# Patient Record
Sex: FEMALE | Race: WHITE | NOT HISPANIC OR LATINO | Employment: STUDENT | URBAN - METROPOLITAN AREA
[De-identification: names, ages, dates, MRNs, and addresses within clinical notes are randomized per-mention and may not be internally consistent; named-entity substitution may affect disease eponyms.]

---
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
## 2020-02-10 ENCOUNTER — HOSPITAL ENCOUNTER (OUTPATIENT)
Dept: ULTRASOUND IMAGING | Facility: HOSPITAL | Age: 20
Discharge: HOME | End: 2020-02-10
Admitting: SURGERY

## 2020-02-10 DIAGNOSIS — R92.8 ABNORMAL ULTRASOUND OF BREAST: ICD-10-CM

## 2020-02-10 PROCEDURE — 76642 ULTRASOUND BREAST LIMITED: CPT

## 2020-02-12 ENCOUNTER — TELEPHONE (OUTPATIENT)
Dept: SURGERY | Facility: CLINIC | Age: 20
End: 2020-02-12

## 2020-02-12 NOTE — TELEPHONE ENCOUNTER
Left breast ultrasound [facility] February 10, 2020: 19-year-old female with an area of pain in the left breast.  Targeted ultrasound through the area of pain corresponding to 9 through 12:00 shows normal-appearing fibroglandular tissue.  No abnormality.

## 2020-02-20 ENCOUNTER — TELEPHONE (OUTPATIENT)
Dept: OBSTETRICS AND GYNECOLOGY | Age: 20
End: 2020-02-20

## 2020-02-20 NOTE — TELEPHONE ENCOUNTER
Patient had her Kyleena inserted in 5/19 and has had off and on spotting.Her last episode of spotting was 12/19.She is aware that some spotting is to be expected for a few months.She just noticed some more spotting earlier when she used the bathroom.No cramping. Just wants to make sure this is ok.Please advise.

## 2020-02-20 NOTE — TELEPHONE ENCOUNTER
Spoke with patient , she is doing good, had some unexpected  spotting last night just wanted to check if that was ok since had no period or spotting since July , 2019.  Reassured her that spotting is not unusual with Kyadonisena , patient verbally understanding , will follow up with dr looney in march this yr.

## 2020-03-17 ENCOUNTER — TELEPHONE (OUTPATIENT)
Dept: SURGERY | Facility: CLINIC | Age: 20
End: 2020-03-17

## 2020-03-17 NOTE — TELEPHONE ENCOUNTER
I had to leave patient a message to call back.   Follow up appt with Dr. Méndez 3/24/2020 canceled due to COVID19 epidemic.     I ask that patient call back and confirm.   
No

## 2020-04-07 ENCOUNTER — TELEPHONE (OUTPATIENT)
Dept: SURGERY | Facility: CLINIC | Age: 20
End: 2020-04-07

## 2020-04-27 ENCOUNTER — TELEPHONE (OUTPATIENT)
Dept: SURGERY | Facility: CLINIC | Age: 20
End: 2020-04-27

## 2020-05-07 ENCOUNTER — TELEPHONE (OUTPATIENT)
Dept: SURGERY | Facility: CLINIC | Age: 20
End: 2020-05-07

## 2020-05-07 NOTE — TELEPHONE ENCOUNTER
Patient called to cancel appointment with Dr. Méndez on 5/8/2020.    I will have Kelsey call her back to reschedule.

## 2020-05-08 ENCOUNTER — TELEPHONE (OUTPATIENT)
Dept: SURGERY | Facility: CLINIC | Age: 20
End: 2020-05-08

## 2020-05-08 NOTE — TELEPHONE ENCOUNTER
Received message from Neris, patient canceled her appt on 5-8-2020 and needed to reschedule.      Spoke to patient she will see Dr. NAVARRO 5-22-20 arrive 1:45  Pt v/uFlorencia Cole

## 2020-05-22 ENCOUNTER — TELEPHONE (OUTPATIENT)
Dept: SURGERY | Facility: CLINIC | Age: 20
End: 2020-05-22

## 2020-05-22 NOTE — TELEPHONE ENCOUNTER
Received message from Neris, patient canceled her appointment  5-22-20 with .    I have rescheduled her for 6-12-20 arrive 11:15    LM on patient's phone.    Kelesy

## 2020-06-19 ENCOUNTER — OFFICE VISIT (OUTPATIENT)
Dept: SURGERY | Facility: CLINIC | Age: 20
End: 2020-06-19

## 2020-06-19 VITALS
OXYGEN SATURATION: 98 % | BODY MASS INDEX: 25.03 KG/M2 | SYSTOLIC BLOOD PRESSURE: 119 MMHG | WEIGHT: 169 LBS | HEART RATE: 67 BPM | HEIGHT: 69 IN | DIASTOLIC BLOOD PRESSURE: 79 MMHG

## 2020-06-19 DIAGNOSIS — N64.4 MASTODYNIA: ICD-10-CM

## 2020-06-19 DIAGNOSIS — N63.0 LUMP OR MASS IN BREAST: ICD-10-CM

## 2020-06-19 DIAGNOSIS — N60.01 SOLITARY CYST OF RIGHT BREAST: Primary | ICD-10-CM

## 2020-06-19 DIAGNOSIS — N61.0 CELLULITIS OF BREAST: ICD-10-CM

## 2020-06-19 DIAGNOSIS — R92.8 ABNORMAL ULTRASOUND OF BREAST: ICD-10-CM

## 2020-06-19 PROCEDURE — 99212 OFFICE O/P EST SF 10 MIN: CPT | Performed by: SURGERY

## 2020-06-19 NOTE — PROGRESS NOTES
Chief Complaint: Sara Saul is a 20 y.o. female who was seen in consultation at the request of Henri Tran MD  for breast mass and a followup visit  RIGHT BREAST     History of Present Illness:  Patient presents with breast mass, abnormal breast imaging and breast infection.  She noted a RIGHT breast mass the size of a golf ball  when she got out of the shower.  She was then seen and placed on Levaquin which she took from  through .  She tells me that the mass and the majority of the redness and all of the tenderness resolved.  She tells me that there is some mild pink skin discoloration residual on a little bit of skin thickening at that site.  She has been off antibiotics since .  She noted no other new masses, skin changes, nipple discharge, nipple changes prior to her most recent imaging.  Her most recent imaging includes the followin2019  [facility]    Radiology  Sara Saul  US BREAST RIGHT LIMITED  Area of clinical concern right breast 9:00 4 cm from the nipple. There is a 1.9 x 0.9 x 1.3 cm area of heterogeneous echotexture. There is an internal hypoechoic focus that measures 0.3 cm.  Most consistent with an area of inflammation, edema and/or phlegmon. No evidence for an abscess.  BIRADS Category 3    She has not had a breast biopsy in the past.  She has her uterus and ovaries, is premenopausal, and takes nor hormones.  Her family history includes the following:  No family history of breast or ovarian cancer.  She is not a smoker.    In the interim the area of redness on the right breast has resolved.  She continued the mupirocin ointment to this area until it resolved.  The area in the left breast that we noted had her get an ultrasound for feels tender to her.  Otherwise it has not changed.  She denies any other masses, nipple changes, nipple discharge, skin changes in either breast.    Interval History:  In the interim,  Sara Saul  has done  well.  She reports that she has had no further redness on the right breast.  She reports that the area of tenderness on the left breast is not tender today and has become less pronounced on examination to her.  She says that it is somewhat cyclical in its presence.    She has noted no other changes in her breast exam. No new masses, skin changes, nipple changes, nipple discharge either breast.   She denies headache, bone pain, belly pain, cough, changes in vision or gait.      Her most recent imaging includes the following:  Left breast ultrasound [facility] February 10, 2020: 19-year-old female with an area of pain in the left breast.  Targeted ultrasound through the area of pain corresponding to 9 through 12:00 shows normal-appearing fibroglandular tissue.  No abnormality.        Review of Systems:  Review of Systems   Constitutional: Positive for unexpected weight change (6 lb wt gain ).   All other systems reviewed and are negative.       Past Medical and Surgical History:  Breast Biopsy History:  Patient has not had a breast biopsy in the past.  Breast Cancer HIstory:  Patient does not have a past medical history of breast cancer.  Breast Operations, and year:  NONE   Obstetric/Gynecologic History:  Age menstrual periods began: 14  Patient is premenopausal, first day of last period: 8/26/19 APPROX.   Number of pregnancies: 0  Number of live births: 0  Number of abortions or miscarriages: 0  Age of delivery of first child: N/A   BREAST FEEDING: N/A   Length of time taking birth control pills:   Patient has never taken hormone replacement  PATIENT HAS BOTH OVARIES AND UTERUS.     Past Surgical History:   Procedure Laterality Date   • EYE SURGERY     • EYE SURGERY      AGE 3   • INTRAUTERINE DEVICE INSERTION     • LASIK     • WISDOM TOOTH EXTRACTION       Past Medical History:   Diagnosis Date   • Breast lump in female 2019    RIGHT        Prior Hospitalizations, other than for surgery or childbirth, and  "year:  NONE     Social History     Socioeconomic History   • Marital status: Single     Spouse name: Not on file   • Number of children: Not on file   • Years of education: Not on file   • Highest education level: Not on file   Occupational History   • Occupation: student   Tobacco Use   • Smoking status: Never Smoker   • Smokeless tobacco: Never Used   Substance and Sexual Activity   • Alcohol use: No   • Drug use: No   • Sexual activity: Yes     Partners: Male     Birth control/protection: IUD     Comment: KYLEENA 05/10/19     Patient is single.  Patient is employed full time with the following occupation: [employer]  Patient does not drink caffeine.    Family History:  Family History   Problem Relation Age of Onset   • Breast cancer Neg Hx    • Ovarian cancer Neg Hx    • Uterine cancer Neg Hx    • Colon cancer Neg Hx        Vital Signs:  /79   Pulse 67   Ht 175.3 cm (69\")   Wt 76.7 kg (169 lb)   LMP  (LMP Unknown)   SpO2 98%   Breastfeeding No   BMI 24.96 kg/m²      Medications:    Current Outpatient Medications:   •  levonorgestrel (KYLEENA) 19.5 MG intrauterine device IUD, 1 each by Intrauterine route 1 (One) Time., Disp: , Rfl:      Allergies:  No Known Allergies    Physical Examination:  /79   Pulse 67   Ht 175.3 cm (69\")   Wt 76.7 kg (169 lb)   LMP  (LMP Unknown)   SpO2 98%   Breastfeeding No   BMI 24.96 kg/m²   General Appearance:  Patient is in no distress.  She is well kept and has an average build.   Psychiatric:  Patient with appropriate mood and affect. Alert and oriented to self, time, and place.    Breast, RIGHT:  medium sized, 34D, symmetric with the contralateral side.  Breast skin is without , rashes, erythema.   Pacifically, at the right breast 9:00, 4 cm from the nipple location there is no erythema.  There are no visible abnormalities upon inspection during the arm-raising maneuver or with hands on hips in the sitting position. There is no nipple retraction, discharge " or nipple/areolar skin changes.There are no masses palpable in the sitting or supine positions.  Some upper inner quadrant glandular density that is symmetric with the left, see below    Breast, LEFT:  medium sized, 34D, symmetric with the contralateral side.  Breast skin is without erythema, edema, rashes.  There are no visible abnormalities upon inspection during the arm-raising maneuver or with hands on hips in the sitting position. There is no nipple retraction, discharge or nipple/areolar skin changes. At the left breast 11:00, 8 cm from the nipple location, there is glandular thickening that is symmetric with the right breast in the mirror image location.  There is no discrete mass..  There is no breast tenderness today.  There are no  masses palpable in the sitting or supine positions.    Lymphatic:  There is no axillary, cervical, infraclavicular, or supraclavicular adenopathy bilaterally.  Eyes:  Pupils are round and reactive to light.  Cardiovascular:  Heart rate and rhythm are regular.  Respiratory:  Lungs are clear bilaterally with no crackles or wheezes in any lung field.  Gastrointestinal:  Abdomen is soft, nondistended, and nontender.     Musculoskeletal:  Good strength in all 4 extremities.   There is good range of motion in both shoulders.    Skin:  No new skin lesions or rashes on the skin excluding the breast (see breast exam above).      Imagin2019  [facility]    Radiology  [facility]  US BREAST RIGHT LIMITED  Area of clinical concern right breast 9:00 4 cm from the nipple. There is a 1.9 x 0.9 x 1.3 cm area of heterogeneous echotexture. There is an internal hypoechoic focus that measures 0.3 cm.  Most consistent with an area of inflammation, edema and/or phlegmon. No evidence for an abscess.  BIRADS Category 3    Left breast ultrasound [facility] February 10, 2020: 19-year-old female with an area of pain in the left breast.  Targeted ultrasound through the area of pain  corresponding to 9 through 12:00 shows normal-appearing fibroglandular tissue.  No abnormality      8-27-19 [facility]  IMPRESSION:  1. Findings most consistent with prominent fibrous parenchyma surrounded  by glandular tissue at the site of clinical concern in the left breast  in the upper inner quadrant. Six-month sonographic follow-up is  recommended to ensure stability.     BI-RADS Category 3: Probably benign. Short-term follow-up is  recommended.     Left breast ultrasound [facility] February 10, 2020: 19-year-old female with an area of pain in the left breast.  Targeted ultrasound through the area of pain corresponding to 9 through 12:00 shows normal-appearing fibroglandular tissue.  No abnormality.      Procedures:  Ultrasound-guided cyst aspiration 8-27-19  Indication:  inflamed cyst versus inflammatory collection-abscess, underlying area of erythema of skin  Location: RIGHT breast 9:00, 4 CFN  Consent:  The risks, benefits, and alternatives to the procedure were discussed with the patient, who understood and wished to proceed.  The risks described included, but were not limited to, bleeding, infection and cyst recurrence requiring percutaneous excisional biopsy.  Description of Procedure:   After the patient was positioned supine on the procedure table, I scanned the breast parenchyma deep to the area of clinical concern where there is mild erythema, notably at the 9:00, 4 cm from the nipple location.  At this site, just deep to the dermis there is a 7 x 6 mm anechoic mass with good through-transmission.  This is just deep to the dermis.  The skin demonstrates mild thickening overlying the lesion.  This is consistent with either an inflamed cyst or a localized collection of inflammation.      I then prepped and draped the breast skin in sterile fashion.  I anesthetized the breast skin with 1% lidocaine with epinephrine.  I then anesthetized the underlying subcutaneous tissue and breast parenchyma  surrounding the lesion with 1% lidocaine with epinephrine under ultrasound visualization and guidance. I then inserted a 21 G needle (attached to a syringe) into the cyst under ultrasound guidance and aspirated the cyst fluid until the cyst completely disappeared. The fluid aspirated was clear, fluid, <1ml.  The fluid was sent for culture.  Manual compression was held for 5 minutes and a bandaid applied.  Marker placed: none  Tolerance: The patient tolerated the procedure well.  Disposition: FU 2 weeks after topical mupirocin.    Assessment:   Diagnosis Plan   1. Solitary cyst of right breast     2. Cellulitis of breast     3. Lump or mass in breast     4. Abnormal ultrasound of breast     5. Mastodynia          1,2  RIGHT 9:00, 4 CFN- initial mas larger per history, associated with cellulitis. Took levaquin- palpable mass resolved, the redness with mild persistence  - on 7-29-19 US a 1.9 cm area of edema/phlegmon was seen - BR3  -in office US today shows a 7x5mm cystic lesion just deep to the dermis underlying the area of residual erythema on exam- <1ml fluid removed, no bacteria- treated with topical mupirocin since superficial- resolved 9-2019    - aspirated and started topical mupirocin 8-27-19  -At June 2020 follow-up, asymptomatic not present on exam      2-  LEFT breast 11:00, 8 CFN- 3 cm mass on exam-   - US [facility] BI-RADS 3 prominent fibrous parenchyma surrounded by glandular tissue at the site of clinical concern upper inner quadrant recommend six-month ultrasound.  -February 2020 left ultrasound shows no abnormality.  Symptoms improved and exam benign June 2020.    Plan:  Sara and I reviewed her interval history, imaging, imaging reports and examination together today.    I am reassured that the area of inflammation or infection on the right breast has totally resolved.  On the left side the exam finding is symmetric with the right.  She is asymptomatic.  She tells me that this waxes  and wanes somewhat cyclically.    In the presence of clinical stability and a benign examination today and benign imaging, I have not given her a routine routine follow-up in our office.  I did asked her to continue her self breast exam monthly in addition to her clinical breast exam annually and to let us know in the future if she has any concerns.  We will be happy to see her back.      Carmina Méndez MD      Next Appointment:  Return for any future concerns.      EMR Dragon/transcription disclaimer:    Much of this encounter note is an electronic transcription/translocation of spoken language to printed text.  The electronic translation of spoken language may permit erroneous, or at times, nonsensical words or phrases to be inadvertently transcribed.  Although I have reviewed the note from such areas, some may still exist.

## 2020-07-22 ENCOUNTER — OFFICE VISIT (OUTPATIENT)
Dept: OBSTETRICS AND GYNECOLOGY | Age: 20
End: 2020-07-22

## 2020-07-22 VITALS
WEIGHT: 163 LBS | DIASTOLIC BLOOD PRESSURE: 72 MMHG | SYSTOLIC BLOOD PRESSURE: 110 MMHG | HEIGHT: 69 IN | BODY MASS INDEX: 24.14 KG/M2

## 2020-07-22 DIAGNOSIS — Z30.431 ENCOUNTER FOR ROUTINE CHECKING OF INTRAUTERINE CONTRACEPTIVE DEVICE (IUD): ICD-10-CM

## 2020-07-22 DIAGNOSIS — Z01.419 ENCOUNTER FOR GYNECOLOGICAL EXAMINATION WITHOUT ABNORMAL FINDING: Primary | ICD-10-CM

## 2020-07-22 DIAGNOSIS — Z11.3 SCREEN FOR STD (SEXUALLY TRANSMITTED DISEASE): ICD-10-CM

## 2020-07-22 PROBLEM — Z97.5 IUD (INTRAUTERINE DEVICE) IN PLACE: Status: ACTIVE | Noted: 2020-07-22

## 2020-07-22 PROCEDURE — 99395 PREV VISIT EST AGE 18-39: CPT | Performed by: OBSTETRICS & GYNECOLOGY

## 2020-07-22 NOTE — PROGRESS NOTES
"Subjective   Sara Saul is a 20 y.o. female cc: annual visit today , bailey inserted 05/10/19 , pt have no issues with iud had couple of periods every 3 months or so lasted couple of days . Had right breast mass this past year- saw Dr. Méndez -was breast infection. Likes Bailey.  Staying here for [employer] - thinking communications[employer].   History of Present Illness     The following portions of the patient's history were reviewed and updated as appropriate: allergies, current medications, past family history, past medical history, past social history, past surgical history and problem list.    Review of Systems   Constitutional: Negative for chills, fatigue and fever.   Gastrointestinal: Negative for abdominal distention and abdominal pain.   Genitourinary: Negative for dyspareunia, dysuria, menstrual problem, pelvic pain, vaginal bleeding, vaginal discharge and vaginal pain.   All other systems reviewed and are negative.    /72   Ht 175.3 cm (69\")   Wt 73.9 kg (163 lb)   LMP  (LMP Unknown)   Breastfeeding No   BMI 24.07 kg/m²     Objective   Physical Exam   Constitutional: She is oriented to person, place, and time. She appears well-developed and well-nourished.   HENT:   Head: Normocephalic and atraumatic.   Eyes: Conjunctivae are normal.   Neck: Normal range of motion. Neck supple. No thyromegaly present.   Cardiovascular: Normal rate and regular rhythm.   Pulmonary/Chest: Effort normal and breath sounds normal.   Abdominal: Soft. Bowel sounds are normal. She exhibits no distension. There is no tenderness.   Genitourinary: Vagina normal and uterus normal. Pelvic exam was performed with patient supine. Uterus is not enlarged and not tender. Cervix exhibits no motion tenderness and no discharge. Right adnexum displays no mass and no tenderness. Left adnexum displays no mass and no tenderness. No vaginal discharge found.   Genitourinary Comments: DEFERRED   Musculoskeletal: Normal range of motion. "   Neurological: She is alert and oriented to person, place, and time.   Psychiatric: She has a normal mood and affect. Her behavior is normal.   Nursing note and vitals reviewed.        Assessment/Plan   Sara was seen today for gynecologic exam.    Diagnoses and all orders for this visit:    Encounter for gynecological examination without abnormal finding    Encounter for routine checking of intrauterine contraceptive device (IUD)    Screen for STD (sexually transmitted disease)  -     NuSwab VG+ - Swab, Vagina      Counseling was given to patient for the following topics: instructions for management, risk factor reductions and self-breast exams .

## 2020-07-30 LAB
A VAGINAE DNA VAG QL NAA+PROBE: ABNORMAL SCORE
BVAB2 DNA VAG QL NAA+PROBE: ABNORMAL SCORE
C ALBICANS DNA VAG QL NAA+PROBE: POSITIVE
C GLABRATA DNA VAG QL NAA+PROBE: NEGATIVE
C TRACH DNA VAG QL NAA+PROBE: NEGATIVE
MEGA1 DNA VAG QL NAA+PROBE: ABNORMAL SCORE
N GONORRHOEA DNA VAG QL NAA+PROBE: NEGATIVE
T VAGINALIS DNA VAG QL NAA+PROBE: NEGATIVE

## 2020-07-30 RX ORDER — FLUCONAZOLE 150 MG/1
150 TABLET ORAL ONCE
Qty: 1 TABLET | Refills: 0 | Status: SHIPPED | OUTPATIENT
Start: 2020-07-30 | End: 2020-07-30

## 2020-08-10 ENCOUNTER — OFFICE VISIT (OUTPATIENT)
Dept: FAMILY MEDICINE CLINIC | Facility: CLINIC | Age: 20
End: 2020-08-10

## 2020-08-10 VITALS
DIASTOLIC BLOOD PRESSURE: 70 MMHG | HEART RATE: 77 BPM | OXYGEN SATURATION: 99 % | HEIGHT: 69 IN | SYSTOLIC BLOOD PRESSURE: 124 MMHG | BODY MASS INDEX: 23.7 KG/M2 | WEIGHT: 160 LBS | TEMPERATURE: 97.8 F

## 2020-08-10 DIAGNOSIS — Z23 NEED FOR HPV VACCINATION: ICD-10-CM

## 2020-08-10 DIAGNOSIS — D50.9 IRON DEFICIENCY ANEMIA, UNSPECIFIED IRON DEFICIENCY ANEMIA TYPE: ICD-10-CM

## 2020-08-10 DIAGNOSIS — Z00.00 ROUTINE GENERAL MEDICAL EXAMINATION AT A HEALTH CARE FACILITY: Primary | ICD-10-CM

## 2020-08-10 DIAGNOSIS — Z13.6 SCREENING FOR ISCHEMIC HEART DISEASE: ICD-10-CM

## 2020-08-10 DIAGNOSIS — Z13.1 SCREENING FOR DIABETES MELLITUS: ICD-10-CM

## 2020-08-10 PROCEDURE — 90471 IMMUNIZATION ADMIN: CPT | Performed by: NURSE PRACTITIONER

## 2020-08-10 PROCEDURE — 90649 4VHPV VACCINE 3 DOSE IM: CPT | Performed by: NURSE PRACTITIONER

## 2020-08-10 PROCEDURE — 99395 PREV VISIT EST AGE 18-39: CPT | Performed by: NURSE PRACTITIONER

## 2020-08-10 NOTE — PROGRESS NOTES
"Subjective   Sara Saul is a 20 y.o. female.     History of Present Illness   Well Adult Physical: Patient here for a comprehensive physical exam.The patient reports no problems  Do you take any herbs or supplements that were not prescribed by a doctor? no Are you taking calcium supplements? no Are you taking aspirin daily? no      The following portions of the patient's history were reviewed and updated as appropriate: allergies, current medications, past social history and problem list.    Review of Systems   Constitutional: Negative for fever.   Respiratory: Negative for cough and shortness of breath.    Cardiovascular: Negative for chest pain.   Gastrointestinal: Negative for abdominal pain.   Neurological: Negative for dizziness.       Objective   /70   Pulse 77   Temp 97.8 °F (36.6 °C)   Ht 175.3 cm (69.02\")   Wt 72.6 kg (160 lb)   LMP  (LMP Unknown)   SpO2 99%   BMI 23.62 kg/m²   Physical Exam   Constitutional: She is oriented to person, place, and time. She appears well-developed and well-nourished. No distress.   HENT:   Head: Normocephalic and atraumatic. Hair is normal.   Right Ear: Hearing, tympanic membrane, external ear and ear canal normal. No drainage. No decreased hearing is noted.   Left Ear: Hearing, tympanic membrane, external ear and ear canal normal. No decreased hearing is noted.   Nose: No nasal deformity.   Mouth/Throat: Oropharynx is clear and moist.   Eyes: Pupils are equal, round, and reactive to light. Conjunctivae, EOM and lids are normal. Right eye exhibits no discharge. Left eye exhibits no discharge.   Neck: Normal range of motion. Neck supple. No JVD present. No tracheal deviation present. No thyromegaly present.   Cardiovascular: Normal rate, regular rhythm, normal heart sounds, intact distal pulses and normal pulses. Exam reveals no gallop and no friction rub.   No murmur heard.  Pulmonary/Chest: Effort normal and breath sounds normal. No respiratory distress. She " has no wheezes. She has no rales. She exhibits no tenderness.   Abdominal: Soft. Bowel sounds are normal. She exhibits no distension and no mass. There is no tenderness. There is no rebound and no guarding. No hernia.   Musculoskeletal: Normal range of motion. She exhibits no edema, tenderness or deformity.   Lymphadenopathy:     She has no cervical adenopathy.   Neurological: She is alert and oriented to person, place, and time. She has normal reflexes. She displays normal reflexes. No cranial nerve deficit. She exhibits normal muscle tone. Coordination normal.   Skin: Skin is warm and dry. No rash noted. She is not diaphoretic. No erythema.   Psychiatric: She has a normal mood and affect. Her behavior is normal. Judgment and thought content normal.   Vitals reviewed.      Assessment/Plan      Diagnosis Plan   1. Routine general medical examination at a health care facility     2. Iron deficiency anemia, unspecified iron deficiency anemia type  CBC & Differential    Iron Profile   3. Screening for diabetes mellitus  Comprehensive Metabolic Panel   4. Screening for ischemic heart disease  Lipid Panel With LDL / HDL Ratio     Discussed weight, diet and exercise  Follow up after labs      NICOLE Newell  8/10/2020

## 2020-08-11 LAB
ALBUMIN SERPL-MCNC: 4.7 G/DL (ref 3.5–5.2)
ALBUMIN/GLOB SERPL: 2.6 G/DL
ALP SERPL-CCNC: 71 U/L (ref 39–117)
ALT SERPL-CCNC: 8 U/L (ref 1–33)
AST SERPL-CCNC: 12 U/L (ref 1–32)
BASOPHILS # BLD AUTO: 0.05 10*3/MM3 (ref 0–0.2)
BASOPHILS NFR BLD AUTO: 0.7 % (ref 0–1.5)
BILIRUB SERPL-MCNC: 0.5 MG/DL (ref 0–1.2)
BUN SERPL-MCNC: 12 MG/DL (ref 6–20)
BUN/CREAT SERPL: 13.6 (ref 7–25)
CALCIUM SERPL-MCNC: 9.7 MG/DL (ref 8.6–10.5)
CHLORIDE SERPL-SCNC: 103 MMOL/L (ref 98–107)
CHOLEST SERPL-MCNC: 140 MG/DL (ref 0–200)
CO2 SERPL-SCNC: 24.9 MMOL/L (ref 22–29)
CREAT SERPL-MCNC: 0.88 MG/DL (ref 0.57–1)
EOSINOPHIL # BLD AUTO: 0.16 10*3/MM3 (ref 0–0.4)
EOSINOPHIL NFR BLD AUTO: 2.4 % (ref 0.3–6.2)
ERYTHROCYTE [DISTWIDTH] IN BLOOD BY AUTOMATED COUNT: 12.6 % (ref 12.3–15.4)
GLOBULIN SER CALC-MCNC: 1.8 GM/DL
GLUCOSE SERPL-MCNC: 95 MG/DL (ref 65–99)
HCT VFR BLD AUTO: 41.6 % (ref 34–46.6)
HDLC SERPL-MCNC: 55 MG/DL (ref 40–60)
HGB BLD-MCNC: 14.4 G/DL (ref 12–15.9)
IMM GRANULOCYTES # BLD AUTO: 0.02 10*3/MM3 (ref 0–0.05)
IMM GRANULOCYTES NFR BLD AUTO: 0.3 % (ref 0–0.5)
IRON SATN MFR SERPL: 22 % (ref 20–50)
IRON SERPL-MCNC: 86 MCG/DL (ref 37–145)
LDLC SERPL CALC-MCNC: 69 MG/DL (ref 0–100)
LDLC/HDLC SERPL: 1.25 {RATIO}
LYMPHOCYTES # BLD AUTO: 2.24 10*3/MM3 (ref 0.7–3.1)
LYMPHOCYTES NFR BLD AUTO: 33.1 % (ref 19.6–45.3)
MCH RBC QN AUTO: 29.8 PG (ref 26.6–33)
MCHC RBC AUTO-ENTMCNC: 34.6 G/DL (ref 31.5–35.7)
MCV RBC AUTO: 86.1 FL (ref 79–97)
MONOCYTES # BLD AUTO: 0.54 10*3/MM3 (ref 0.1–0.9)
MONOCYTES NFR BLD AUTO: 8 % (ref 5–12)
NEUTROPHILS # BLD AUTO: 3.76 10*3/MM3 (ref 1.7–7)
NEUTROPHILS NFR BLD AUTO: 55.5 % (ref 42.7–76)
NRBC BLD AUTO-RTO: 0 /100 WBC (ref 0–0.2)
PLATELET # BLD AUTO: 276 10*3/MM3 (ref 140–450)
POTASSIUM SERPL-SCNC: 4.2 MMOL/L (ref 3.5–5.2)
PROT SERPL-MCNC: 6.5 G/DL (ref 6–8.5)
RBC # BLD AUTO: 4.83 10*6/MM3 (ref 3.77–5.28)
SODIUM SERPL-SCNC: 139 MMOL/L (ref 136–145)
TIBC SERPL-MCNC: 393 MCG/DL
TRIGL SERPL-MCNC: 82 MG/DL (ref 0–150)
UIBC SERPL-MCNC: 307 MCG/DL (ref 112–346)
VLDLC SERPL CALC-MCNC: 16.4 MG/DL
WBC # BLD AUTO: 6.77 10*3/MM3 (ref 3.4–10.8)

## 2020-10-15 ENCOUNTER — CLINICAL SUPPORT (OUTPATIENT)
Dept: FAMILY MEDICINE CLINIC | Facility: CLINIC | Age: 20
End: 2020-10-15

## 2020-10-15 DIAGNOSIS — Z23 NEED FOR HPV VACCINATION: Primary | ICD-10-CM

## 2020-10-15 PROCEDURE — 90651 9VHPV VACCINE 2/3 DOSE IM: CPT | Performed by: NURSE PRACTITIONER

## 2020-10-15 PROCEDURE — 90471 IMMUNIZATION ADMIN: CPT | Performed by: NURSE PRACTITIONER

## 2020-12-02 ENCOUNTER — OFFICE VISIT (OUTPATIENT)
Dept: OBSTETRICS AND GYNECOLOGY | Age: 20
End: 2020-12-02

## 2020-12-02 VITALS
WEIGHT: 168 LBS | HEIGHT: 69 IN | SYSTOLIC BLOOD PRESSURE: 112 MMHG | DIASTOLIC BLOOD PRESSURE: 76 MMHG | BODY MASS INDEX: 24.88 KG/M2

## 2020-12-02 DIAGNOSIS — Z97.5 BREAKTHROUGH BLEEDING ASSOCIATED WITH INTRAUTERINE DEVICE (IUD): ICD-10-CM

## 2020-12-02 DIAGNOSIS — Z30.431 ENCOUNTER FOR ROUTINE CHECKING OF INTRAUTERINE CONTRACEPTIVE DEVICE (IUD): Primary | ICD-10-CM

## 2020-12-02 DIAGNOSIS — Z97.5 IUD (INTRAUTERINE DEVICE) IN PLACE: ICD-10-CM

## 2020-12-02 DIAGNOSIS — Z13.89 SCREENING FOR BLOOD OR PROTEIN IN URINE: ICD-10-CM

## 2020-12-02 DIAGNOSIS — N92.1 BREAKTHROUGH BLEEDING ASSOCIATED WITH INTRAUTERINE DEVICE (IUD): ICD-10-CM

## 2020-12-02 PROCEDURE — 99213 OFFICE O/P EST LOW 20 MIN: CPT | Performed by: OBSTETRICS & GYNECOLOGY

## 2020-12-02 NOTE — PROGRESS NOTES
"Subjective   Sara Saul is a 20 y.o. female presents for gyn f/u for painful menstrual cycles, last swab 7/22/20 showed yeast, she finish antibiotic, kyleena inserted 5/10/19.  Reports last month her period was very different and painful she usually gets a period once every 3 months and starting two months ago she is bleeding every 3 weeks. Pt had a right breast mass this past year saw Dr. Méndez and it was breast infection.     History of Present Illness    The following portions of the patient's history were reviewed and updated as appropriate: allergies, current medications, past family history, past medical history, past social history, past surgical history and problem list.    Review of Systems   Constitutional: Negative for chills, fatigue and fever.   Gastrointestinal: Negative for abdominal distention and abdominal pain.   Genitourinary: Positive for menstrual problem. Negative for dyspareunia, dysuria, pelvic pain, vaginal bleeding, vaginal discharge and vaginal pain.   All other systems reviewed and are negative.    /76   Ht 175.3 cm (69.02\")   Wt 76.2 kg (168 lb)   LMP 11/21/2020 (Approximate)   Breastfeeding No   BMI 24.80 kg/m²     Objective   Physical Exam  Vitals signs and nursing note reviewed.   Constitutional:       Appearance: Normal appearance. She is normal weight.   Pulmonary:      Effort: Pulmonary effort is normal.   Genitourinary:     General: Normal vulva.      Labia:         Right: No rash or lesion.         Left: No rash or lesion.       Vagina: Normal.      Cervix: Normal.      Comments: IUD strings visualized at os  Neurological:      Mental Status: She is alert and oriented to person, place, and time.   Psychiatric:         Mood and Affect: Mood normal.         Behavior: Behavior normal.         Assessment/Plan   Diagnoses and all orders for this visit:    1. Encounter for routine checking of intrauterine contraceptive device (IUD) (Primary)    2. Screening for blood " or protein in urine  -     Cancel: POC Urinalysis Dipstick    3. IUD (intrauterine device) in place    4. Breakthrough bleeding associated with intrauterine device (IUD)  -     NuSwab VG+ - Swab, Vagina       Counseling was given to patient for the following topics: diagnostic results, instructions for management and impressions . Total time of the encounter was 20 minutes and 15 minutes was spend counseling.

## 2020-12-09 LAB
A VAGINAE DNA VAG QL NAA+PROBE: NORMAL SCORE
BVAB2 DNA VAG QL NAA+PROBE: NORMAL SCORE
C ALBICANS DNA VAG QL NAA+PROBE: NEGATIVE
C GLABRATA DNA VAG QL NAA+PROBE: NEGATIVE
C TRACH DNA VAG QL NAA+PROBE: NEGATIVE
MEGA1 DNA VAG QL NAA+PROBE: NORMAL SCORE
N GONORRHOEA DNA VAG QL NAA+PROBE: NEGATIVE
T VAGINALIS DNA VAG QL NAA+PROBE: NEGATIVE

## 2021-07-12 ENCOUNTER — OFFICE VISIT (OUTPATIENT)
Dept: OBSTETRICS AND GYNECOLOGY | Age: 21
End: 2021-07-12

## 2021-07-12 VITALS — DIASTOLIC BLOOD PRESSURE: 70 MMHG | SYSTOLIC BLOOD PRESSURE: 110 MMHG | BODY MASS INDEX: 25.39 KG/M2 | WEIGHT: 172 LBS

## 2021-07-12 DIAGNOSIS — L73.9 FOLLICULITIS: Primary | ICD-10-CM

## 2021-07-12 PROCEDURE — 99212 OFFICE O/P EST SF 10 MIN: CPT | Performed by: NURSE PRACTITIONER

## 2021-07-12 NOTE — PROGRESS NOTES
Subjective     Chief Complaint   Patient presents with   • Gynecologic Exam     bump on  left breast       Sara Saul is a 21 y.o.  whose LMP is No LMP recorded (lmp unknown). Patient has had an implant.     Pt presents today with chief complaint of bump on left breast   She had an ingrown hair 3 weeks ago at the area of concern  She removed the hair a couple of weeks ago and the area has significantly decreased in size  She denies pain, nipple nipple discharge or drainage from the area  She has not used anything topically  Pt of Dr. Delatorre       No Additional Complaints Reported    The following portions of the patient's history were reviewed and updated as appropriate:vital signs, allergies, current medications, past medical history, past social history, past surgical history and problem list      Review of Systems   A comprehensive review of systems was negative except for: Integument/breast: positive for skin lesion(s)     Objective      /70   Wt 78 kg (172 lb)   LMP  (LMP Unknown)   BMI 25.39 kg/m²     Physical Exam  Chest:          Comments: Resolving folliculitis - approx 3-4 mm lesion, no drainage or s/s infection         General:   alert and no distress   Heart: Not performed today   Lungs: Not performed today.   Breast: negative findings: normal in size and symmetry, normal contour with no evidence of flattening or dimpling, nipples everted without rashes or discharge, palpation negative for masses or nodules, no palpable axillary lymphadenopathy, positive findings: Resolving folliculitis - see picture for location   Neck: N/A   Abdomen: {Not performed today   CVA: Not performed today   Pelvis: Not performed today   Extremities: Not performed today   Neurologic: negative   Psychiatric: Normal affect, judgement, and mood       Lab Review   Labs: No data reviewed     Imaging   No data reviewed    Assessment/Plan     ASSESSMENT  1. Folliculitis        PLAN  1. No orders of the defined types  were placed in this encounter.      2. Medications prescribed this encounter:      No orders of the defined types were placed in this encounter.      3. Resolving folliculitis. Pt has appt with Dr. Delatorre on 7/28 and will follow up regarding this issue if has not completely resolved. Follow up sooner prn.     Mariana Anderson, NICOLE  7/12/2021

## 2021-07-26 ENCOUNTER — TELEPHONE (OUTPATIENT)
Dept: FAMILY MEDICINE CLINIC | Facility: CLINIC | Age: 21
End: 2021-07-26

## 2021-07-26 NOTE — TELEPHONE ENCOUNTER
Last hpv  Shot was 10/15/20  Next dose it can be any time  Please call and schedule in nurse/ma apt for 3th and last dose hpv

## 2021-07-26 NOTE — TELEPHONE ENCOUNTER
PATIENT WOULD LIKE TO KNOW WHEN HER LAST HPV VACCINE WAS AND WHEN SHE IS DUE FOR HER NEXT ONE.    PLEASE ADVISE: [phone]

## 2021-07-27 ENCOUNTER — TELEPHONE (OUTPATIENT)
Dept: FAMILY MEDICINE CLINIC | Facility: CLINIC | Age: 21
End: 2021-07-27

## 2021-07-27 ENCOUNTER — CLINICAL SUPPORT (OUTPATIENT)
Dept: FAMILY MEDICINE CLINIC | Facility: CLINIC | Age: 21
End: 2021-07-27

## 2021-07-27 DIAGNOSIS — Z23 NEED FOR HPV VACCINATION: Primary | ICD-10-CM

## 2021-07-27 PROCEDURE — 90651 9VHPV VACCINE 2/3 DOSE IM: CPT | Performed by: NURSE PRACTITIONER

## 2021-07-27 PROCEDURE — 90471 IMMUNIZATION ADMIN: CPT | Performed by: NURSE PRACTITIONER

## 2021-07-27 NOTE — TELEPHONE ENCOUNTER
I would advise her to just watch and wait she does need an appointment to have this evaluated this is not something that I can evaluate over the phone

## 2021-07-27 NOTE — TELEPHONE ENCOUNTER
Pt is  Said she has some bruises for the past 2 weeks  Mostly on legs  For not reason ,she is not taking any anticoagulant medication and haven't done anything to has so many bruises ,please advise

## 2021-07-28 ENCOUNTER — OFFICE VISIT (OUTPATIENT)
Dept: OBSTETRICS AND GYNECOLOGY | Age: 21
End: 2021-07-28

## 2021-07-28 VITALS
DIASTOLIC BLOOD PRESSURE: 70 MMHG | BODY MASS INDEX: 25.62 KG/M2 | WEIGHT: 173 LBS | SYSTOLIC BLOOD PRESSURE: 120 MMHG | HEIGHT: 69 IN

## 2021-07-28 DIAGNOSIS — Z12.4 SCREENING FOR MALIGNANT NEOPLASM OF THE CERVIX: ICD-10-CM

## 2021-07-28 DIAGNOSIS — R23.3 EASY BRUISING: ICD-10-CM

## 2021-07-28 DIAGNOSIS — N76.0 BV (BACTERIAL VAGINOSIS): ICD-10-CM

## 2021-07-28 DIAGNOSIS — Z11.3 SCREEN FOR STD (SEXUALLY TRANSMITTED DISEASE): ICD-10-CM

## 2021-07-28 DIAGNOSIS — Z97.5 IUD (INTRAUTERINE DEVICE) IN PLACE: ICD-10-CM

## 2021-07-28 DIAGNOSIS — B96.89 BV (BACTERIAL VAGINOSIS): ICD-10-CM

## 2021-07-28 DIAGNOSIS — Z01.419 ENCOUNTER FOR GYNECOLOGICAL EXAMINATION WITHOUT ABNORMAL FINDING: Primary | ICD-10-CM

## 2021-07-28 DIAGNOSIS — Z30.431 ENCOUNTER FOR ROUTINE CHECKING OF INTRAUTERINE CONTRACEPTIVE DEVICE (IUD): ICD-10-CM

## 2021-07-28 DIAGNOSIS — N39.490 OVERFLOW INCONTINENCE OF URINE: ICD-10-CM

## 2021-07-28 PROBLEM — Z30.9 ENCOUNTER FOR CONTRACEPTIVE MANAGEMENT: Status: RESOLVED | Noted: 2019-03-14 | Resolved: 2021-07-28

## 2021-07-28 PROBLEM — N92.1 BREAKTHROUGH BLEEDING ASSOCIATED WITH INTRAUTERINE DEVICE (IUD): Status: RESOLVED | Noted: 2020-12-02 | Resolved: 2021-07-28

## 2021-07-28 PROCEDURE — 99213 OFFICE O/P EST LOW 20 MIN: CPT | Performed by: OBSTETRICS & GYNECOLOGY

## 2021-07-28 PROCEDURE — 99395 PREV VISIT EST AGE 18-39: CPT | Performed by: OBSTETRICS & GYNECOLOGY

## 2021-07-28 NOTE — PROGRESS NOTES
"Subjective   Sara Saul is a 21 y.o. female cc: annual visit today , last vag swab 12/2020 neg , kyleena - contraception.  Love the Kyleena. 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  Patient c/o recently easy bruising. Will check labs.     Kyleena inserted 05/10/19.  At [facility] - [employer].     History of Present Illness    The following portions of the patient's history were reviewed and updated as appropriate: allergies, current medications, past family history, past medical history, past social history, past surgical history and problem list.    Review of Systems   Constitutional: Negative for chills, fatigue and fever.   Gastrointestinal: Negative for abdominal distention and abdominal pain.   Genitourinary: Negative for dyspareunia, dysuria, menstrual problem, pelvic pain, vaginal bleeding, vaginal discharge and vaginal pain.   Hematological: Bruises/bleeds easily.   All other systems reviewed and are negative.    /70   Ht 175.3 cm (69.02\")   Wt 78.5 kg (173 lb)   LMP  (LMP Unknown)   Breastfeeding No   BMI 25.53 kg/m²     Objective   Physical Exam  Vitals and nursing note reviewed.   Constitutional:       Appearance: Normal appearance. She is well-developed and normal weight.   Neck:      Thyroid: No thyromegaly.   Pulmonary:      Effort: Pulmonary effort is normal.   Chest:      Breasts:         Right: No mass, nipple discharge, skin change or tenderness.         Left: No mass, nipple discharge, skin change or tenderness.   Abdominal:      General: There is no distension.      Palpations: Abdomen is soft.      Tenderness: There is no abdominal tenderness.   Genitourinary:     General: Normal vulva.      Exam position: Lithotomy position.      Labia:         Right: No rash or lesion.         Left: No rash or lesion.       Vagina: Normal. No vaginal discharge.      Cervix: No friability, lesion or cervical bleeding.      Uterus: Not enlarged and not tender.       " Adnexa:         Right: No mass or tenderness.          Left: No mass or tenderness.        Comments: IUD strings visualized at os   Musculoskeletal:         General: Normal range of motion.      Cervical back: Normal range of motion and neck supple.   Skin:     General: Skin is warm and dry.      Findings: No rash.   Neurological:      Mental Status: She is alert and oriented to person, place, and time.   Psychiatric:         Mood and Affect: Mood normal.         Behavior: Behavior normal.           Assessment/Plan   Diagnoses and all orders for this visit:    1. Encounter for gynecological examination without abnormal finding (Primary)    2. Screening for malignant neoplasm of the cervix  -     IGP, Rfx Aptima HPV ASCU    3. IUD (intrauterine device) in place    4. Encounter for routine checking of intrauterine contraceptive device (IUD)    5. Screen for STD (sexually transmitted disease)  -     NuSwab VG+ - Swab, Vagina    6. Easy bruising  -     CBC (No Diff)  -     Protime-INR  -     aPTT  -     Von Willebrand Panel    7. Overflow incontinence of urine  -     Urine Culture - Urine, Urine, Clean Catch        Counseling was given to patient for the following topics: instructions for management, impressions, importance of treatment compliance and self-breast exams .   Return in about 1 year (around 7/28/2022) for Annual physical.

## 2021-07-29 LAB
APTT PPP: 31.1 SECONDS (ref 22.7–35.4)
ERYTHROCYTE [DISTWIDTH] IN BLOOD BY AUTOMATED COUNT: 12.4 % (ref 12.3–15.4)
FACT VIII ACT/NOR PPP: 135 % (ref 56–140)
HCT VFR BLD AUTO: 43.4 % (ref 34–46.6)
HGB BLD-MCNC: 14.4 G/DL (ref 12–15.9)
INR PPP: 1.07 (ref 0.9–1.1)
MCH RBC QN AUTO: 29.8 PG (ref 26.6–33)
MCHC RBC AUTO-ENTMCNC: 33.2 G/DL (ref 31.5–35.7)
MCV RBC AUTO: 89.9 FL (ref 79–97)
PATH INTERP BLD-IMP: NORMAL
PLATELET # BLD AUTO: 252 10*3/MM3 (ref 140–450)
PROTHROMBIN TIME: 13.8 SECONDS (ref 11.7–14.2)
RBC # BLD AUTO: 4.83 10*6/MM3 (ref 3.77–5.28)
VWF AG ACT/NOR PPP IA: 110 % (ref 50–200)
VWF:RCO ACT/NOR PPP PL AGG: 105 % (ref 50–200)
WBC # BLD AUTO: 6.87 10*3/MM3 (ref 3.4–10.8)

## 2021-07-30 LAB
BACTERIA UR CULT: NORMAL
BACTERIA UR CULT: NORMAL
CONV .: NORMAL
CYTOLOGIST CVX/VAG CYTO: NORMAL
CYTOLOGY CVX/VAG DOC CYTO: NORMAL
CYTOLOGY CVX/VAG DOC THIN PREP: NORMAL
DX ICD CODE: NORMAL
HIV 1 & 2 AB SER-IMP: NORMAL
OTHER STN SPEC: NORMAL
STAT OF ADQ CVX/VAG CYTO-IMP: NORMAL

## 2021-07-30 NOTE — PROGRESS NOTES
Dr looney , since her labs are normal margaux wants to know if you want her have a consult with specialist for bruising or she is ok just to keep appt with PCP on 8/9/21 ? She is aware u out till Monday , thanks .

## 2021-07-31 LAB
A VAGINAE DNA VAG QL NAA+PROBE: ABNORMAL SCORE
BVAB2 DNA VAG QL NAA+PROBE: ABNORMAL SCORE
C ALBICANS DNA VAG QL NAA+PROBE: NEGATIVE
C GLABRATA DNA VAG QL NAA+PROBE: NEGATIVE
C TRACH DNA VAG QL NAA+PROBE: NEGATIVE
MEGA1 DNA VAG QL NAA+PROBE: ABNORMAL SCORE
N GONORRHOEA DNA VAG QL NAA+PROBE: NEGATIVE
T VAGINALIS DNA VAG QL NAA+PROBE: NEGATIVE

## 2021-08-02 RX ORDER — METRONIDAZOLE 500 MG/1
500 TABLET ORAL 2 TIMES DAILY
Qty: 14 TABLET | Refills: 0 | Status: SHIPPED | OUTPATIENT
Start: 2021-08-02 | End: 2021-08-09

## 2021-08-23 ENCOUNTER — OFFICE VISIT (OUTPATIENT)
Dept: FAMILY MEDICINE CLINIC | Facility: CLINIC | Age: 21
End: 2021-08-23

## 2021-08-23 VITALS
BODY MASS INDEX: 24.57 KG/M2 | OXYGEN SATURATION: 100 % | SYSTOLIC BLOOD PRESSURE: 116 MMHG | WEIGHT: 171.6 LBS | HEART RATE: 69 BPM | TEMPERATURE: 97.4 F | RESPIRATION RATE: 16 BRPM | DIASTOLIC BLOOD PRESSURE: 80 MMHG | HEIGHT: 70 IN

## 2021-08-23 DIAGNOSIS — Z13.0 SCREENING FOR IRON DEFICIENCY ANEMIA: ICD-10-CM

## 2021-08-23 DIAGNOSIS — Z00.00 ROUTINE GENERAL MEDICAL EXAMINATION AT A HEALTH CARE FACILITY: Primary | ICD-10-CM

## 2021-08-23 DIAGNOSIS — Z13.1 SCREENING FOR DIABETES MELLITUS: ICD-10-CM

## 2021-08-23 DIAGNOSIS — Z13.6 SCREENING FOR ISCHEMIC HEART DISEASE: ICD-10-CM

## 2021-08-23 DIAGNOSIS — T14.8XXA BRUISING: ICD-10-CM

## 2021-08-23 PROCEDURE — 99213 OFFICE O/P EST LOW 20 MIN: CPT | Performed by: NURSE PRACTITIONER

## 2021-08-23 PROCEDURE — 99395 PREV VISIT EST AGE 18-39: CPT | Performed by: NURSE PRACTITIONER

## 2021-08-23 NOTE — PROGRESS NOTES
"Chief Complaint  Annual Exam (Patient not fasting. Discuss Bruising in the back of both legs,it has been 2 weeks since she had some bruising. )    Subjective          Sara Saul presents to [facility]  History of Present Illness  Well Adult Physical: Patient here for a comprehensive physical exam.The patient reports see below  Do you take any herbs or supplements that were not prescribed by a doctor? no Are you taking calcium supplements? no Are you taking aspirin daily? no    Abnormal bruising on back of bilateral legs which she took pictures of.  There is no issue now but she did not have any injury or any reason for bruising and it was a large amounts on the back of her calves in the back of her knees.  They did heal normally this never happened before she is unaware of what caused it  Objective   Vital Signs:   /80   Pulse 69   Temp 97.4 °F (36.3 °C)   Resp 16   Ht 176.5 cm (69.5\")   Wt 77.8 kg (171 lb 9.6 oz)   SpO2 100%   BMI 24.98 kg/m²     Physical Exam  Vitals reviewed.   Constitutional:       General: She is not in acute distress.     Appearance: She is well-developed. She is not diaphoretic.   HENT:      Head: Normocephalic and atraumatic. Hair is normal.      Right Ear: Hearing, tympanic membrane, ear canal and external ear normal. No decreased hearing noted. No drainage.      Left Ear: Hearing, tympanic membrane, ear canal and external ear normal. No decreased hearing noted.      Nose: No nasal deformity.   Eyes:      General: Lids are normal.         Right eye: No discharge.         Left eye: No discharge.      Conjunctiva/sclera: Conjunctivae normal.      Pupils: Pupils are equal, round, and reactive to light.   Neck:      Thyroid: No thyromegaly.      Vascular: No JVD.      Trachea: No tracheal deviation.   Cardiovascular:      Rate and Rhythm: Normal rate and regular rhythm.      Pulses: Normal pulses.      Heart sounds: Normal heart sounds. No murmur " heard.   No friction rub. No gallop.    Pulmonary:      Effort: Pulmonary effort is normal. No respiratory distress.      Breath sounds: Normal breath sounds. No wheezing or rales.   Chest:      Chest wall: No tenderness.   Abdominal:      General: Bowel sounds are normal. There is no distension.      Palpations: Abdomen is soft. There is no mass.      Tenderness: There is no abdominal tenderness. There is no guarding or rebound.      Hernia: No hernia is present.   Musculoskeletal:         General: No tenderness or deformity. Normal range of motion.      Cervical back: Normal range of motion and neck supple.   Lymphadenopathy:      Cervical: No cervical adenopathy.   Skin:     General: Skin is warm and dry.      Findings: No erythema or rash.   Neurological:      Mental Status: She is alert and oriented to person, place, and time.      Cranial Nerves: No cranial nerve deficit.      Motor: No abnormal muscle tone.      Coordination: Coordination normal.      Deep Tendon Reflexes: Reflexes are normal and symmetric. Reflexes normal.   Psychiatric:         Behavior: Behavior normal.         Thought Content: Thought content normal.         Judgment: Judgment normal.        Result Review :                 Assessment and Plan    Diagnoses and all orders for this visit:    1. Routine general medical examination at a health care facility (Primary)    2. Screening for iron deficiency anemia  -     CBC & Differential    3. Screening for diabetes mellitus  -     Comprehensive Metabolic Panel    4. Screening for ischemic heart disease  -     Lipid Panel With LDL / HDL Ratio    5. Bruising  -     Protime-INR  -     APTT        Follow Up   Return if symptoms worsen or fail to improve.  Patient was given instructions and counseling regarding her condition or for health maintenance advice. Please see specific information pulled into the AVS if appropriate.     Discussed weight, diet and exercise  Follow up after labs    Mask and  [employer] worn

## 2021-08-24 LAB
ALBUMIN SERPL-MCNC: 4.6 G/DL (ref 3.5–5.2)
ALBUMIN/GLOB SERPL: 1.7 G/DL
ALP SERPL-CCNC: 73 U/L (ref 39–117)
ALT SERPL-CCNC: 14 U/L (ref 1–33)
AST SERPL-CCNC: 20 U/L (ref 1–32)
BASOPHILS # BLD AUTO: 0.06 10*3/MM3 (ref 0–0.2)
BASOPHILS NFR BLD AUTO: 0.9 % (ref 0–1.5)
BILIRUB SERPL-MCNC: 0.8 MG/DL (ref 0–1.2)
BUN SERPL-MCNC: 13 MG/DL (ref 6–20)
BUN/CREAT SERPL: 15.3 (ref 7–25)
CALCIUM SERPL-MCNC: 10 MG/DL (ref 8.6–10.5)
CHLORIDE SERPL-SCNC: 104 MMOL/L (ref 98–107)
CHOLEST SERPL-MCNC: 134 MG/DL (ref 0–200)
CO2 SERPL-SCNC: 22.3 MMOL/L (ref 22–29)
CREAT SERPL-MCNC: 0.85 MG/DL (ref 0.57–1)
EOSINOPHIL # BLD AUTO: 0.08 10*3/MM3 (ref 0–0.4)
EOSINOPHIL NFR BLD AUTO: 1.3 % (ref 0.3–6.2)
ERYTHROCYTE [DISTWIDTH] IN BLOOD BY AUTOMATED COUNT: 12.7 % (ref 12.3–15.4)
GLOBULIN SER CALC-MCNC: 2.7 GM/DL
GLUCOSE SERPL-MCNC: 95 MG/DL (ref 65–99)
HCT VFR BLD AUTO: 43.2 % (ref 34–46.6)
HDLC SERPL-MCNC: 47 MG/DL (ref 40–60)
HGB BLD-MCNC: 14.3 G/DL (ref 12–15.9)
IMM GRANULOCYTES # BLD AUTO: 0.02 10*3/MM3 (ref 0–0.05)
IMM GRANULOCYTES NFR BLD AUTO: 0.3 % (ref 0–0.5)
INR PPP: 1.04 (ref 0.9–1.1)
LDLC SERPL CALC-MCNC: 70 MG/DL (ref 0–100)
LDLC/HDLC SERPL: 1.47 {RATIO}
LYMPHOCYTES # BLD AUTO: 1.38 10*3/MM3 (ref 0.7–3.1)
LYMPHOCYTES NFR BLD AUTO: 21.6 % (ref 19.6–45.3)
MCH RBC QN AUTO: 29.4 PG (ref 26.6–33)
MCHC RBC AUTO-ENTMCNC: 33.1 G/DL (ref 31.5–35.7)
MCV RBC AUTO: 88.7 FL (ref 79–97)
MONOCYTES # BLD AUTO: 0.57 10*3/MM3 (ref 0.1–0.9)
MONOCYTES NFR BLD AUTO: 8.9 % (ref 5–12)
NEUTROPHILS # BLD AUTO: 4.28 10*3/MM3 (ref 1.7–7)
NEUTROPHILS NFR BLD AUTO: 67 % (ref 42.7–76)
NRBC BLD AUTO-RTO: 0 /100 WBC (ref 0–0.2)
PLATELET # BLD AUTO: 278 10*3/MM3 (ref 140–450)
POTASSIUM SERPL-SCNC: 4.1 MMOL/L (ref 3.5–5.2)
PROT SERPL-MCNC: 7.3 G/DL (ref 6–8.5)
PROTHROMBIN TIME: 13.4 SECONDS (ref 11.7–14.2)
RBC # BLD AUTO: 4.87 10*6/MM3 (ref 3.77–5.28)
SODIUM SERPL-SCNC: 138 MMOL/L (ref 136–145)
TRIGL SERPL-MCNC: 90 MG/DL (ref 0–150)
VLDLC SERPL CALC-MCNC: 17 MG/DL (ref 5–40)
WBC # BLD AUTO: 6.39 10*3/MM3 (ref 3.4–10.8)

## 2022-06-17 ENCOUNTER — TELEPHONE (OUTPATIENT)
Dept: FAMILY MEDICINE CLINIC | Facility: CLINIC | Age: 22
End: 2022-06-17

## 2022-06-17 NOTE — TELEPHONE ENCOUNTER
Pt aware that she has her vaccinations up to date and is making an appointment now to establish care with Dr. Escalona

## 2022-06-17 NOTE — TELEPHONE ENCOUNTER
Caller: Sara Saul    Relationship: Self    Best call back number: [phone]    Who are you requesting to speak with (clinical staff, provider,  specific staff member): CLINICAL STAFF MEMBER    What was the call regarding: PATIENT REQUESTS A CALL BACK TO CONFIRM IF SHE HAS HAD HER SECOND VACCINE FOR HPV OR TO SCHEDULE AN APPOINTMENT IF SHE HAS NOT HAD IT YET.    Do you require a callback: YES

## 2022-09-23 ENCOUNTER — OFFICE VISIT (OUTPATIENT)
Dept: OBSTETRICS AND GYNECOLOGY | Age: 22
End: 2022-09-23

## 2022-09-23 VITALS
DIASTOLIC BLOOD PRESSURE: 70 MMHG | WEIGHT: 179 LBS | SYSTOLIC BLOOD PRESSURE: 110 MMHG | BODY MASS INDEX: 25.62 KG/M2 | HEIGHT: 70 IN

## 2022-09-23 DIAGNOSIS — Z30.431 ENCOUNTER FOR ROUTINE CHECKING OF INTRAUTERINE CONTRACEPTIVE DEVICE (IUD): ICD-10-CM

## 2022-09-23 DIAGNOSIS — A56.2 ACUTE GENITOURINARY CHLAMYDIA TRACHOMATIS INFECTION: ICD-10-CM

## 2022-09-23 DIAGNOSIS — Z11.3 SCREEN FOR STD (SEXUALLY TRANSMITTED DISEASE): ICD-10-CM

## 2022-09-23 DIAGNOSIS — N39.43 DRIBBLING OF URINE: ICD-10-CM

## 2022-09-23 DIAGNOSIS — Z12.4 SCREENING FOR MALIGNANT NEOPLASM OF THE CERVIX: ICD-10-CM

## 2022-09-23 DIAGNOSIS — Z01.419 ENCOUNTER FOR GYNECOLOGICAL EXAMINATION WITHOUT ABNORMAL FINDING: Primary | ICD-10-CM

## 2022-09-23 DIAGNOSIS — B96.89 BV (BACTERIAL VAGINOSIS): ICD-10-CM

## 2022-09-23 DIAGNOSIS — N76.0 BV (BACTERIAL VAGINOSIS): ICD-10-CM

## 2022-09-23 DIAGNOSIS — Z13.89 SCREENING FOR BLOOD OR PROTEIN IN URINE: ICD-10-CM

## 2022-09-23 DIAGNOSIS — Z97.5 IUD (INTRAUTERINE DEVICE) IN PLACE: ICD-10-CM

## 2022-09-23 PROBLEM — N61.0 MASTITIS, RIGHT, ACUTE: Status: RESOLVED | Noted: 2019-07-22 | Resolved: 2022-09-23

## 2022-09-23 PROBLEM — Z13.0 SCREENING FOR IRON DEFICIENCY ANEMIA: Status: RESOLVED | Noted: 2019-08-06 | Resolved: 2022-09-23

## 2022-09-23 PROBLEM — Z13.1 SCREENING FOR DIABETES MELLITUS: Status: RESOLVED | Noted: 2018-05-25 | Resolved: 2022-09-23

## 2022-09-23 LAB
BILIRUB BLD-MCNC: NEGATIVE MG/DL
GLUCOSE UR STRIP-MCNC: NEGATIVE MG/DL
KETONES UR QL: NEGATIVE
LEUKOCYTE EST, POC: NEGATIVE
NITRITE UR-MCNC: NEGATIVE MG/ML
PH UR: 6.5 [PH] (ref 5–8)
PROT UR STRIP-MCNC: NEGATIVE MG/DL
RBC # UR STRIP: NEGATIVE /UL
SP GR UR: 1.01 (ref 1–1.03)
UROBILINOGEN UR QL: NORMAL

## 2022-09-23 PROCEDURE — 81002 URINALYSIS NONAUTO W/O SCOPE: CPT | Performed by: OBSTETRICS & GYNECOLOGY

## 2022-09-23 PROCEDURE — 99395 PREV VISIT EST AGE 18-39: CPT | Performed by: OBSTETRICS & GYNECOLOGY

## 2022-09-23 NOTE — PROGRESS NOTES
"Subjective   Sara Saul is a 22 y.o. female presents for annual visit today. wing for contraception no periods  , seen PCP for bruising back in august last yr.  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 Going to look for a new car today. Has noticed in th last few months she is dribbling urine after voiding - will check urine cx - if negative will refer to Urogyn.     I last saw her for annual visit a year ago Love the Kyleena. 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  Patient c/o recently easy bruising.     History of Present Illness    The following portions of the patient's history were reviewed and updated as appropriate: allergies, current medications, past family history, past medical history, past social history, past surgical history and problem list.    Review of Systems   Constitutional: Negative for chills, fatigue and fever.   Genitourinary: Negative for dyspareunia, dysuria, menstrual problem, pelvic pain, vaginal bleeding, vaginal discharge and vaginal pain.        + urinary dribbling after voiding    All other systems reviewed and are negative.    /70   Ht 176.5 cm (69.5\")   Wt 81.2 kg (179 lb)   LMP  (LMP Unknown)   Breastfeeding No   BMI 26.05 kg/m²     Objective   Physical Exam  Vitals and nursing note reviewed.   Constitutional:       Appearance: Normal appearance. She is well-developed and normal weight.   Neck:      Thyroid: No thyromegaly.   Pulmonary:      Effort: Pulmonary effort is normal.   Chest:   Breasts:      Right: No mass, nipple discharge, skin change or tenderness.      Left: No mass, nipple discharge, skin change or tenderness.       Abdominal:      General: There is no distension.      Palpations: Abdomen is soft.      Tenderness: There is no abdominal tenderness.   Genitourinary:     General: Normal vulva.      Exam position: Supine.      Vagina: Normal. No vaginal discharge or bleeding.      Cervix: No friability, lesion or cervical " bleeding.      Uterus: Not enlarged and not tender.       Adnexa:         Right: No mass or tenderness.          Left: No mass or tenderness.        Comments: IUD strings visualized at os   Musculoskeletal:         General: Normal range of motion.      Cervical back: Normal range of motion.   Skin:     General: Skin is warm and dry.      Findings: No rash.   Neurological:      Mental Status: She is alert and oriented to person, place, and time.   Psychiatric:         Mood and Affect: Mood normal.         Behavior: Behavior normal.           Assessment & Plan   Diagnoses and all orders for this visit:    1. Encounter for routine checking of intrauterine contraceptive device (IUD) (Primary)    2. Screening for malignant neoplasm of the cervix  -     IGP, Rfx Aptima HPV ASCU    3. IUD (intrauterine device) in place    4. Encounter for gynecological examination without abnormal finding    5. Screen for STD (sexually transmitted disease)  -     NuSwab VG+ - Swab, Vagina    6. Dribbling of urine  -     Urine Culture - Urine, Urine, Clean Catch    7. Screening for blood or protein in urine  -     POC Urinalysis Dipstick      Counseling was given to patient for the following topics: instructions for management, impressions, importance of treatment compliance and self-breast exams .  Return in about 1 year (around 9/23/2023) for Annual physical.

## 2022-09-25 LAB
BACTERIA UR CULT: NORMAL
BACTERIA UR CULT: NORMAL

## 2022-09-26 DIAGNOSIS — N39.43 DRIBBLING OF URINE: Primary | ICD-10-CM

## 2022-09-26 LAB
A VAGINAE DNA VAG QL NAA+PROBE: ABNORMAL SCORE
BVAB2 DNA VAG QL NAA+PROBE: ABNORMAL SCORE
C ALBICANS DNA VAG QL NAA+PROBE: NEGATIVE
C GLABRATA DNA VAG QL NAA+PROBE: NEGATIVE
C TRACH DNA VAG QL NAA+PROBE: POSITIVE
MEGA1 DNA VAG QL NAA+PROBE: ABNORMAL SCORE
N GONORRHOEA DNA VAG QL NAA+PROBE: NEGATIVE
T VAGINALIS DNA VAG QL NAA+PROBE: NEGATIVE

## 2022-09-27 PROBLEM — B96.89 BV (BACTERIAL VAGINOSIS): Status: ACTIVE | Noted: 2022-09-27

## 2022-09-27 PROBLEM — A56.2 ACUTE GENITOURINARY CHLAMYDIA TRACHOMATIS INFECTION: Status: ACTIVE | Noted: 2022-09-27

## 2022-09-27 PROBLEM — N76.0 BV (BACTERIAL VAGINOSIS): Status: ACTIVE | Noted: 2022-09-27

## 2022-09-27 RX ORDER — AZITHROMYCIN 500 MG/1
1000 TABLET, FILM COATED ORAL ONCE
Qty: 2 TABLET | Refills: 0 | Status: SHIPPED | OUTPATIENT
Start: 2022-09-27 | End: 2022-09-27

## 2022-09-27 RX ORDER — METRONIDAZOLE 500 MG/1
500 TABLET ORAL 2 TIMES DAILY
Qty: 14 TABLET | Refills: 0 | Status: SHIPPED | OUTPATIENT
Start: 2022-09-27 | End: 2022-09-28

## 2022-09-28 ENCOUNTER — OFFICE VISIT (OUTPATIENT)
Dept: FAMILY MEDICINE CLINIC | Facility: CLINIC | Age: 22
End: 2022-09-28

## 2022-09-28 VITALS
DIASTOLIC BLOOD PRESSURE: 82 MMHG | RESPIRATION RATE: 16 BRPM | BODY MASS INDEX: 25.6 KG/M2 | SYSTOLIC BLOOD PRESSURE: 120 MMHG | HEIGHT: 70 IN | TEMPERATURE: 97.8 F | WEIGHT: 178.8 LBS | HEART RATE: 89 BPM | OXYGEN SATURATION: 98 %

## 2022-09-28 DIAGNOSIS — Z23 NEEDS FLU SHOT: ICD-10-CM

## 2022-09-28 DIAGNOSIS — Z00.00 ENCOUNTER FOR ANNUAL PHYSICAL EXAM: Primary | ICD-10-CM

## 2022-09-28 DIAGNOSIS — Z23 NEED FOR TDAP VACCINATION: ICD-10-CM

## 2022-09-28 PROBLEM — N39.43 DRIBBLING OF URINE: Status: RESOLVED | Noted: 2022-09-23 | Resolved: 2022-09-28

## 2022-09-28 PROBLEM — N76.0 BV (BACTERIAL VAGINOSIS): Status: RESOLVED | Noted: 2022-09-27 | Resolved: 2022-09-28

## 2022-09-28 PROBLEM — H66.002 LEFT ACUTE SUPPURATIVE OTITIS MEDIA: Status: RESOLVED | Noted: 2019-07-22 | Resolved: 2022-09-28

## 2022-09-28 PROBLEM — B96.89 BV (BACTERIAL VAGINOSIS): Status: RESOLVED | Noted: 2022-09-27 | Resolved: 2022-09-28

## 2022-09-28 PROBLEM — A56.2 ACUTE GENITOURINARY CHLAMYDIA TRACHOMATIS INFECTION: Status: RESOLVED | Noted: 2022-09-27 | Resolved: 2022-09-28

## 2022-09-28 PROBLEM — T14.8XXA BRUISING: Status: RESOLVED | Noted: 2021-08-23 | Resolved: 2022-09-28

## 2022-09-28 LAB
BILIRUB BLD-MCNC: NEGATIVE MG/DL
CLARITY, POC: CLEAR
COLOR UR: YELLOW
EXPIRATION DATE: ABNORMAL
GLUCOSE UR STRIP-MCNC: NEGATIVE MG/DL
KETONES UR QL: NEGATIVE
LEUKOCYTE EST, POC: NEGATIVE
Lab: ABNORMAL
NITRITE UR-MCNC: NEGATIVE MG/ML
PH UR: 6 [PH] (ref 5–8)
PROT UR STRIP-MCNC: ABNORMAL MG/DL
RBC # UR STRIP: ABNORMAL /UL
SP GR UR: 1.03 (ref 1–1.03)
UROBILINOGEN UR QL: ABNORMAL

## 2022-09-28 PROCEDURE — 90715 TDAP VACCINE 7 YRS/> IM: CPT | Performed by: FAMILY MEDICINE

## 2022-09-28 PROCEDURE — 90686 IIV4 VACC NO PRSV 0.5 ML IM: CPT | Performed by: FAMILY MEDICINE

## 2022-09-28 PROCEDURE — 99395 PREV VISIT EST AGE 18-39: CPT | Performed by: FAMILY MEDICINE

## 2022-09-28 PROCEDURE — 90472 IMMUNIZATION ADMIN EACH ADD: CPT | Performed by: FAMILY MEDICINE

## 2022-09-28 PROCEDURE — 81003 URINALYSIS AUTO W/O SCOPE: CPT | Performed by: FAMILY MEDICINE

## 2022-09-28 PROCEDURE — 90471 IMMUNIZATION ADMIN: CPT | Performed by: FAMILY MEDICINE

## 2022-09-28 NOTE — PROGRESS NOTES
Patient Care Team:  Gloria Escalona MD as PCP - General (Urgent Care)     Chief complaint: Patient is in today for a physical          Patient is a 22 y.o. female who presents for her yearly physical exam.     HPI      Pt usually sees NICOLE Fritz , today to establish care with me, physical and to discuss the following;    Eats HD, do exercise  She is c/o dry cough that started this am with dry throat and congestion , no F,C,no sick contact   She is sexually active ,has IUD since 5/2019     Labs and notes from previous PCP reviewed , all looks normal   She just had PAP and GYN exam few days ago    Lab Results   Component Value Date    CHLPL 134 08/23/2021    TRIG 90 08/23/2021    HDL 47 08/23/2021    LDL 70 08/23/2021     Lab Results   Component Value Date    GLUCOSE 95 08/23/2021    BUN 13 08/23/2021    CREATININE 0.85 08/23/2021    EGFRIFNONA 84 08/23/2021    EGFRIFAFRI 102 08/23/2021    BCR 15.3 08/23/2021    K 4.1 08/23/2021    CO2 22.3 08/23/2021    CALCIUM 10.0 08/23/2021    PROTENTOTREF 7.3 08/23/2021    ALBUMIN 4.60 08/23/2021    LABIL2 1.7 08/23/2021    AST 20 08/23/2021    ALT 14 08/23/2021     WBC   Date Value Ref Range Status   08/23/2021 6.39 3.40 - 10.80 10*3/mm3 Final     RBC   Date Value Ref Range Status   08/23/2021 4.87 3.77 - 5.28 10*6/mm3 Final     Hemoglobin   Date Value Ref Range Status   08/23/2021 14.3 12.0 - 15.9 g/dL Final     Hematocrit   Date Value Ref Range Status   08/23/2021 43.2 34.0 - 46.6 % Final     MCV   Date Value Ref Range Status   08/23/2021 88.7 79.0 - 97.0 fL Final     MCH   Date Value Ref Range Status   08/23/2021 29.4 26.6 - 33.0 pg Final     MCHC   Date Value Ref Range Status   08/23/2021 33.1 31.5 - 35.7 g/dL Final     RDW   Date Value Ref Range Status   08/23/2021 12.7 12.3 - 15.4 % Final     Platelets   Date Value Ref Range Status   08/23/2021 278 140 - 450 10*3/mm3 Final     Neutrophil Rel %   Date Value Ref Range Status   08/23/2021 67.0 42.7 - 76.0 % Final      Lymphocyte Rel %   Date Value Ref Range Status   08/23/2021 21.6 19.6 - 45.3 % Final     Monocyte Rel %   Date Value Ref Range Status   08/23/2021 8.9 5.0 - 12.0 % Final     Eosinophil Rel %   Date Value Ref Range Status   08/23/2021 1.3 0.3 - 6.2 % Final     Basophil Rel %   Date Value Ref Range Status   08/23/2021 0.9 0.0 - 1.5 % Final     Neutrophils Absolute   Date Value Ref Range Status   08/23/2021 4.28 1.70 - 7.00 10*3/mm3 Final     Lymphocytes Absolute   Date Value Ref Range Status   08/23/2021 1.38 0.70 - 3.10 10*3/mm3 Final     Monocytes Absolute   Date Value Ref Range Status   08/23/2021 0.57 0.10 - 0.90 10*3/mm3 Final     Eosinophils Absolute   Date Value Ref Range Status   08/23/2021 0.08 0.00 - 0.40 10*3/mm3 Final     Basophils Absolute   Date Value Ref Range Status   08/23/2021 0.06 0.00 - 0.20 10*3/mm3 Final     nRBC   Date Value Ref Range Status   08/23/2021 0.0 0.0 - 0.2 /100 WBC Final         Health maintenance/lifestyle:  Immunization History   Administered Date(s) Administered   • COVID-19 (PFIZER) PURPLE CAP 05/18/2021, 06/08/2021   • Flu Vaccine Quad PF >36MO 11/03/2018   • HPV Quadrivalent 08/10/2020   • Hep B, Unspecified 2000, 2000, 02/01/2002   • Hepatitis A 05/25/2018   • Hpv9 10/15/2020, 07/27/2021   • Influenza, Unspecified 09/20/2019   • MMR 06/19/2001, 02/04/2005   • Meningococcal MCV4P (Menactra) 05/25/2018   • Meningococcal, Unspecified 08/16/2011   • Tdap 08/16/2011   • Varicella 06/10/2003, 08/16/2011         PHQ-2 Depression Screening  Little interest or pleasure in doing things? 0-->not at all   Feeling down, depressed, or hopeless? 0-->not at all   PHQ-2 Total Score 0         Social History     Tobacco Use   Smoking Status Never Smoker   Smokeless Tobacco Never Used     Social History     Substance and Sexual Activity   Alcohol Use No         Review of Systems   Constitutional: Negative for activity change, appetite change, chills and fever.   HENT: Positive for  "congestion. Negative for ear discharge, ear pain, postnasal drip, rhinorrhea, sinus pressure, sore throat, swollen glands and trouble swallowing.    Respiratory: Positive for cough. Negative for shortness of breath and wheezing.    All other systems reviewed and are negative.        History  Past Medical History:   Diagnosis Date   • Breast lump in female 2019    RIGHT       Past Surgical History:   Procedure Laterality Date   • EYE SURGERY     • EYE SURGERY      AGE 3   • INTRAUTERINE DEVICE INSERTION     • LASIK     • WISDOM TOOTH EXTRACTION        No Known Allergies   Family History   Problem Relation Age of Onset   • Breast cancer Neg Hx    • Ovarian cancer Neg Hx    • Uterine cancer Neg Hx    • Colon cancer Neg Hx      Social History     Socioeconomic History   • Marital status: Single   Tobacco Use   • Smoking status: Never Smoker   • Smokeless tobacco: Never Used   Substance and Sexual Activity   • Alcohol use: No   • Drug use: No   • Sexual activity: Yes     Partners: Male     Birth control/protection: I.U.D.     Comment: KYBRIANENA 05/10/19        Current Outpatient Medications:   •  levonorgestrel (KYLEENA) 19.5 MG intrauterine device IUD, 1 each by Intrauterine route 1 (One) Time., Disp: , Rfl:                   /82   Pulse 89   Temp 97.8 °F (36.6 °C)   Resp 16   Ht 176.5 cm (69.5\")   Wt 81.1 kg (178 lb 12.8 oz)   LMP  (LMP Unknown)   SpO2 98%   BMI 26.03 kg/m²       Physical Exam  Vitals and nursing note reviewed.   Constitutional:       General: She is not in acute distress.     Appearance: Normal appearance. She is well-developed. She is not ill-appearing, toxic-appearing or diaphoretic.   HENT:      Head: Normocephalic.      Right Ear: Tympanic membrane, ear canal and external ear normal.      Left Ear: Tympanic membrane, ear canal and external ear normal.      Nose: Nose normal. No congestion or rhinorrhea.      Mouth/Throat:      Mouth: Mucous membranes are moist.      Pharynx: Oropharynx " is clear. No oropharyngeal exudate or posterior oropharyngeal erythema.   Eyes:      General: No scleral icterus.        Right eye: No discharge.         Left eye: No discharge.      Extraocular Movements: Extraocular movements intact.      Conjunctiva/sclera: Conjunctivae normal.      Pupils: Pupils are equal, round, and reactive to light.   Neck:      Thyroid: No thyromegaly.      Comments: No enlarged thyroid    Cardiovascular:      Rate and Rhythm: Normal rate and regular rhythm.      Heart sounds: Normal heart sounds. No murmur heard.    No friction rub. No gallop.   Pulmonary:      Effort: Pulmonary effort is normal. No respiratory distress.      Breath sounds: Normal breath sounds. No stridor. No wheezing, rhonchi or rales.   Abdominal:      General: Bowel sounds are normal. There is no distension.      Palpations: Abdomen is soft. There is no mass.      Tenderness: There is no abdominal tenderness. There is no guarding or rebound.      Hernia: No hernia is present.   Musculoskeletal:         General: No tenderness or deformity. Normal range of motion.      Cervical back: Normal range of motion and neck supple.   Skin:     General: Skin is warm.      Findings: No bruising, erythema, lesion or rash.   Neurological:      General: No focal deficit present.      Mental Status: She is alert and oriented to person, place, and time.      Cranial Nerves: No cranial nerve deficit.      Sensory: No sensory deficit.      Motor: No weakness or abnormal muscle tone.      Coordination: Coordination normal.      Gait: Gait normal.      Deep Tendon Reflexes: Reflexes normal.   Psychiatric:         Mood and Affect: Mood normal.         Behavior: Behavior normal.         Thought Content: Thought content normal.         Judgment: Judgment normal.                   Diagnoses and all orders for this visit:    1. Encounter for annual physical exam (Primary)  -     Lipid Panel  -     Comprehensive Metabolic Panel  -     CBC (No  Diff)  -     POC Urinalysis Dipstick, Automated    2. Needs flu shot  -     FluLaval/Fluzone >6 mos (4649-1059)    3. Need for Tdap vaccination  -     Tdap Vaccine Greater Than or Equal To 6yo IM      Discussed with pt; Regular exercise, healthy diet. Calcium intake, Sunscreen use encouraged.       Follow up: Return in about 1 year (around 9/28/2023) for physical.  Plan of care discussed with pt. They verbalized understanding and agreement.     Gloria Escalona MD   9/28/2022   10:29 EDT

## 2022-09-29 LAB
ALBUMIN SERPL-MCNC: 4.7 G/DL (ref 3.5–5.2)
ALBUMIN/GLOB SERPL: 2.2 G/DL
ALP SERPL-CCNC: 73 U/L (ref 39–117)
ALT SERPL-CCNC: 11 U/L (ref 1–33)
AST SERPL-CCNC: 23 U/L (ref 1–32)
BILIRUB SERPL-MCNC: 0.5 MG/DL (ref 0–1.2)
BUN SERPL-MCNC: 14 MG/DL (ref 6–20)
BUN/CREAT SERPL: 15.6 (ref 7–25)
CALCIUM SERPL-MCNC: 9.5 MG/DL (ref 8.6–10.5)
CHLORIDE SERPL-SCNC: 102 MMOL/L (ref 98–107)
CHOLEST SERPL-MCNC: 132 MG/DL (ref 0–200)
CO2 SERPL-SCNC: 27.9 MMOL/L (ref 22–29)
CREAT SERPL-MCNC: 0.9 MG/DL (ref 0.57–1)
EGFRCR SERPLBLD CKD-EPI 2021: 92.9 ML/MIN/1.73
ERYTHROCYTE [DISTWIDTH] IN BLOOD BY AUTOMATED COUNT: 12.7 % (ref 12.3–15.4)
GLOBULIN SER CALC-MCNC: 2.1 GM/DL
GLUCOSE SERPL-MCNC: 100 MG/DL (ref 65–99)
HCT VFR BLD AUTO: 42.5 % (ref 34–46.6)
HDLC SERPL-MCNC: 54 MG/DL (ref 40–60)
HGB BLD-MCNC: 14.5 G/DL (ref 12–15.9)
LDLC SERPL CALC-MCNC: 65 MG/DL (ref 0–100)
MCH RBC QN AUTO: 29 PG (ref 26.6–33)
MCHC RBC AUTO-ENTMCNC: 34.1 G/DL (ref 31.5–35.7)
MCV RBC AUTO: 85 FL (ref 79–97)
PLATELET # BLD AUTO: 267 10*3/MM3 (ref 140–450)
POTASSIUM SERPL-SCNC: 4.3 MMOL/L (ref 3.5–5.2)
PROT SERPL-MCNC: 6.8 G/DL (ref 6–8.5)
RBC # BLD AUTO: 5 10*6/MM3 (ref 3.77–5.28)
SODIUM SERPL-SCNC: 139 MMOL/L (ref 136–145)
TRIGL SERPL-MCNC: 59 MG/DL (ref 0–150)
VLDLC SERPL CALC-MCNC: 13 MG/DL (ref 5–40)
WBC # BLD AUTO: 6.05 10*3/MM3 (ref 3.4–10.8)

## 2022-10-01 LAB
CONV .: NORMAL
CYTOLOGIST CVX/VAG CYTO: NORMAL
CYTOLOGY CVX/VAG DOC CYTO: NORMAL
CYTOLOGY CVX/VAG DOC THIN PREP: NORMAL
DX ICD CODE: NORMAL
HIV 1 & 2 AB SER-IMP: NORMAL
Lab: NORMAL
OTHER STN SPEC: NORMAL
STAT OF ADQ CVX/VAG CYTO-IMP: NORMAL

## 2023-05-09 ENCOUNTER — OFFICE VISIT (OUTPATIENT)
Dept: FAMILY MEDICINE CLINIC | Facility: CLINIC | Age: 23
End: 2023-05-09
Payer: COMMERCIAL

## 2023-05-09 VITALS
WEIGHT: 170.2 LBS | DIASTOLIC BLOOD PRESSURE: 82 MMHG | OXYGEN SATURATION: 98 % | HEIGHT: 69 IN | RESPIRATION RATE: 16 BRPM | HEART RATE: 92 BPM | BODY MASS INDEX: 25.21 KG/M2 | TEMPERATURE: 98.4 F | SYSTOLIC BLOOD PRESSURE: 120 MMHG

## 2023-05-09 DIAGNOSIS — Z11.3 SCREEN FOR STD (SEXUALLY TRANSMITTED DISEASE): Primary | ICD-10-CM

## 2023-05-09 PROCEDURE — 99213 OFFICE O/P EST LOW 20 MIN: CPT | Performed by: FAMILY MEDICINE

## 2023-05-09 NOTE — PROGRESS NOTES
Subjective     Sara Saul is a 22 y.o. female.     Chief Complaint   Patient presents with   • Std testing     No sx. Patient stated she had chlamydia in March.       History of Present Illness     She requests to be checked for STD  She has 1 partner In last 6 months  H/o Chlam in 3/2023 , was Rx  Denies vag discharge, no urinary sx   Her partner dx with Chlam recently   Using condom on/off        The following portions of the patient's history were reviewed and updated as appropriate: allergies, current medications, past family history, past medical history, past social history, past surgical history and problem list.        Review of Systems   Genitourinary: Negative for difficulty urinating, dyspareunia, dysuria, frequency and urgency.       Vitals:    05/09/23 1321   BP: 120/82   Pulse: 92   Resp: 16   Temp: 98.4 °F (36.9 °C)   SpO2: 98%           05/09/23  1321   Weight: 77.2 kg (170 lb 3.2 oz)         Body mass index is 24.78 kg/m².      Current Outpatient Medications   Medication Sig Dispense Refill   • levonorgestrel (KYLEENA) 19.5 MG intrauterine device IUD 1 each by Intrauterine route 1 (One) Time.       No current facility-administered medications for this visit.                Objective   Physical Exam  Vitals and nursing note reviewed.   Constitutional:       General: She is not in acute distress.     Appearance: She is not ill-appearing, toxic-appearing or diaphoretic.   Abdominal:      General: Bowel sounds are normal. There is no distension.      Palpations: Abdomen is soft. There is no mass.      Tenderness: There is no abdominal tenderness. There is no right CVA tenderness, left CVA tenderness, guarding or rebound.      Hernia: No hernia is present.   Neurological:      Mental Status: She is alert and oriented to person, place, and time.   Psychiatric:         Mood and Affect: Mood normal.         Behavior: Behavior normal.         Thought Content: Thought content normal.           Assessment  & Plan   Diagnoses and all orders for this visit:    1. Screen for STD (sexually transmitted disease) (Primary)  Comments:  declined blood test for HIV and Syphilis   Orders:  -     Chlamydia trachomatis, Neisseria gonorrhoeae, Trichomonas vaginalis, PCR - Urine, Urine, Clean Catch  - Discussed safe sex        Patient was given instructions and counseling regarding her condition or for health maintenance advice.   Please see specific information pulled into the AVS if appropriate.     I have fully discussed the nature of the medical condition(s) risks, complications, management, safe and proper use of medications.   Pt stated no allergy to the above prescribed medication.  I have discussed the SIDE EFFECT OF MEDICATION and importance TO report any side effect , the patient expressed good understanding.  Encouraged medication compliance and the importance of keeping scheduled follow up appointments with me and any other providers.    Patient instructed to follow up with our office for results on any labs/imaging ordered during this visit.    Home care discussed  All questions answered  Patient verbalizes understanding and agrees to treatment plan.     Follow up: Return for WILL CALL YOU FOR LBAS/XR RESULT.

## 2023-05-10 ENCOUNTER — TELEPHONE (OUTPATIENT)
Dept: FAMILY MEDICINE CLINIC | Facility: CLINIC | Age: 23
End: 2023-05-10
Payer: COMMERCIAL

## 2023-05-10 NOTE — TELEPHONE ENCOUNTER
Pt called because she tested positive for a STI in march at a urologist and wants to know if its possible to know how long she had had it for. States she thinks it was in the summer before March and currently fighting with her boyfriend about when she first got the STI.

## 2023-05-10 NOTE — TELEPHONE ENCOUNTER
Caller: Sara Saul    Relationship: Self    Best call back number: [phone]    Caller requesting test results: PATIENT    What test was performed: URINE CULTURE FOR STD    When was the test performed: 5/9/23    Where was the test performed: OFFICE

## 2023-05-10 NOTE — TELEPHONE ENCOUNTER
Still waiting for STD /urine test result, most likely tomorrow will get the result.  It is impossible to tell for how long she has the infection or when started , there is no test can tell when did you get the infection.    Gloria Escalona MD

## 2023-05-11 ENCOUNTER — TELEPHONE (OUTPATIENT)
Dept: FAMILY MEDICINE CLINIC | Facility: CLINIC | Age: 23
End: 2023-05-11
Payer: COMMERCIAL

## 2023-05-11 LAB
C TRACH RRNA SPEC QL NAA+PROBE: NEGATIVE
N GONORRHOEA RRNA SPEC QL NAA+PROBE: NEGATIVE
T VAGINALIS RRNA SPEC QL NAA+PROBE: NEGATIVE

## 2023-05-11 NOTE — TELEPHONE ENCOUNTER
Caller: Sara Saul    Relationship: Self    Best call back number: [phone]    Caller requesting test results: PATIENT    What test was performed: URINE/STD    When was the test performed:5/9/23    Where was the test performed: OFFICE    Additional notes: PATIENT CALLING BACK TO SEE IF RESULTS ARE IN YET.

## 2023-10-06 ENCOUNTER — OFFICE VISIT (OUTPATIENT)
Dept: OBSTETRICS AND GYNECOLOGY | Age: 23
End: 2023-10-06
Payer: COMMERCIAL

## 2023-10-06 VITALS
WEIGHT: 173 LBS | BODY MASS INDEX: 24.77 KG/M2 | SYSTOLIC BLOOD PRESSURE: 110 MMHG | DIASTOLIC BLOOD PRESSURE: 74 MMHG | HEIGHT: 70 IN

## 2023-10-06 DIAGNOSIS — Z11.3 SCREEN FOR STD (SEXUALLY TRANSMITTED DISEASE): ICD-10-CM

## 2023-10-06 DIAGNOSIS — Z13.89 SCREENING FOR BLOOD OR PROTEIN IN URINE: ICD-10-CM

## 2023-10-06 DIAGNOSIS — Z12.4 SCREENING FOR MALIGNANT NEOPLASM OF THE CERVIX: ICD-10-CM

## 2023-10-06 DIAGNOSIS — Z97.5 IUD (INTRAUTERINE DEVICE) IN PLACE: ICD-10-CM

## 2023-10-06 DIAGNOSIS — Z30.431 ENCOUNTER FOR ROUTINE CHECKING OF INTRAUTERINE CONTRACEPTIVE DEVICE (IUD): Primary | ICD-10-CM

## 2023-10-06 LAB
BILIRUB BLD-MCNC: NEGATIVE MG/DL
GLUCOSE UR STRIP-MCNC: NEGATIVE MG/DL
KETONES UR QL: NEGATIVE
LEUKOCYTE EST, POC: ABNORMAL
NITRITE UR-MCNC: NEGATIVE MG/ML
PH UR: 5.5 [PH] (ref 5–8)
PROT UR STRIP-MCNC: NEGATIVE MG/DL
RBC # UR STRIP: ABNORMAL /UL
SP GR UR: 1.03 (ref 1–1.03)
UROBILINOGEN UR QL: NORMAL

## 2023-10-06 NOTE — PROGRESS NOTES
"Subjective   Sara Saul is a 23 y.o. female presents for annual visit today , last pap 9/23/22 neg  , kyleena for contraception periods are sporadic and light noticed more spotting after IC and some blood  when peeing after sex - IUD due for replacement in may 2024  , STD 5/9/23 neg. 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    I last saw a year ago for annual visit.     History of Present Illness    The following portions of the patient's history were reviewed and updated as appropriate: allergies, current medications, past family history, past medical history, past social history, past surgical history, and problem list.    Review of Systems   Constitutional:  Negative for chills, fatigue and fever.   Gastrointestinal:  Negative for abdominal distention and abdominal pain.   Genitourinary:  Positive for vaginal bleeding. Negative for dyspareunia, dysuria, menstrual problem, pelvic pain, vaginal discharge and vaginal pain.   All other systems reviewed and are negative.  /74   Ht 176.5 cm (69.5\")   Wt 78.5 kg (173 lb)   LMP  (LMP Unknown)   BMI 25.18 kg/m²     Objective   Physical Exam  Vitals and nursing note reviewed.   Constitutional:       Appearance: Normal appearance. She is well-developed and normal weight.   Neck:      Thyroid: No thyromegaly.   Pulmonary:      Effort: Pulmonary effort is normal.   Chest:   Breasts:     Right: No mass, nipple discharge, skin change or tenderness.      Left: No mass, nipple discharge, skin change or tenderness.   Abdominal:      General: There is no distension.      Palpations: Abdomen is soft.      Tenderness: There is no abdominal tenderness.   Genitourinary:     General: Normal vulva.      Exam position: Lithotomy position.      Labia:         Right: No rash or lesion.         Left: No rash or lesion.       Vagina: Normal. No vaginal discharge or bleeding.      Cervix: No friability or lesion.     "  Uterus: Not enlarged and not tender.       Adnexa:         Right: No mass or tenderness.          Left: No mass or tenderness.     Musculoskeletal:         General: Normal range of motion.      Cervical back: Normal range of motion.   Skin:     General: Skin is warm and dry.      Findings: No rash.   Neurological:      Mental Status: She is alert and oriented to person, place, and time.   Psychiatric:         Mood and Affect: Mood normal.         Behavior: Behavior normal.         Assessment & Plan   Diagnoses and all orders for this visit:    1. Encounter for routine checking of intrauterine contraceptive device (IUD) (Primary)    2. Screening for malignant neoplasm of the cervix  -     IGP, Rfx Aptima HPV ASCU    3. Screening for blood or protein in urine  -     POC Urinalysis Dipstick    4. IUD (intrauterine device) in place    5. Screen for STD (sexually transmitted disease)  -     NuSwab VG+ - Swab, Vagina      Counseling was given to patient for the following topics: instructions for management, risks and benefits of treatment options, importance of treatment compliance, and self-breast exams  .   Return in about 27 weeks (around 4/12/2024), or Kyleena removal and replacement.

## 2023-10-10 DIAGNOSIS — B37.9 YEAST INFECTION: Primary | ICD-10-CM

## 2023-10-10 LAB
A VAGINAE DNA VAG QL NAA+PROBE: ABNORMAL SCORE
BVAB2 DNA VAG QL NAA+PROBE: ABNORMAL SCORE
C ALBICANS DNA VAG QL NAA+PROBE: POSITIVE
C GLABRATA DNA VAG QL NAA+PROBE: NEGATIVE
C TRACH DNA VAG QL NAA+PROBE: NEGATIVE
CONV .: NORMAL
CYTOLOGIST CVX/VAG CYTO: NORMAL
CYTOLOGY CVX/VAG DOC CYTO: NORMAL
CYTOLOGY CVX/VAG DOC THIN PREP: NORMAL
DX ICD CODE: NORMAL
HIV 1 & 2 AB SER-IMP: NORMAL
MEGA1 DNA VAG QL NAA+PROBE: ABNORMAL SCORE
N GONORRHOEA DNA VAG QL NAA+PROBE: NEGATIVE
OTHER STN SPEC: NORMAL
STAT OF ADQ CVX/VAG CYTO-IMP: NORMAL
T VAGINALIS DNA VAG QL NAA+PROBE: NEGATIVE

## 2023-10-10 RX ORDER — FLUCONAZOLE 150 MG/1
150 TABLET ORAL ONCE
Qty: 1 TABLET | Refills: 0 | Status: SHIPPED | OUTPATIENT
Start: 2023-10-10 | End: 2023-10-10

## 2023-10-20 ENCOUNTER — OFFICE VISIT (OUTPATIENT)
Dept: FAMILY MEDICINE CLINIC | Facility: CLINIC | Age: 23
End: 2023-10-20
Payer: COMMERCIAL

## 2023-10-20 VITALS
DIASTOLIC BLOOD PRESSURE: 80 MMHG | RESPIRATION RATE: 16 BRPM | OXYGEN SATURATION: 95 % | HEART RATE: 74 BPM | TEMPERATURE: 98 F | WEIGHT: 177.2 LBS | BODY MASS INDEX: 26.25 KG/M2 | HEIGHT: 69 IN | SYSTOLIC BLOOD PRESSURE: 116 MMHG

## 2023-10-20 DIAGNOSIS — F41.9 ANXIETY: Primary | ICD-10-CM

## 2023-10-20 DIAGNOSIS — F41.0 PANIC ATTACKS: ICD-10-CM

## 2023-10-20 PROCEDURE — 99214 OFFICE O/P EST MOD 30 MIN: CPT | Performed by: FAMILY MEDICINE

## 2023-10-20 RX ORDER — HYDROXYZINE HYDROCHLORIDE 25 MG/1
25 TABLET, FILM COATED ORAL 3 TIMES DAILY PRN
Qty: 30 TABLET | Refills: 2 | Status: SHIPPED | OUTPATIENT
Start: 2023-10-20 | End: 2023-10-20 | Stop reason: SDUPTHER

## 2023-10-20 RX ORDER — ESCITALOPRAM OXALATE 10 MG/1
10 TABLET ORAL DAILY
Qty: 60 TABLET | Refills: 1 | Status: SHIPPED | OUTPATIENT
Start: 2023-10-20

## 2023-10-20 RX ORDER — HYDROXYZINE HYDROCHLORIDE 25 MG/1
25 TABLET, FILM COATED ORAL 3 TIMES DAILY PRN
Qty: 30 TABLET | Refills: 2 | Status: SHIPPED | OUTPATIENT
Start: 2023-10-20

## 2023-10-20 NOTE — PROGRESS NOTES
Subjective     Sara Saul is a 23 y.o. female.     Chief Complaint   Patient presents with    Anxiety     Discussed with her mental health therapist about medication's she recommend patient to come to her pcp.        History of Present Illness     Anxiety; FOR MANY YEARS   Following with therapist biweekly   Her sx got worse lately with N in am and having more panic attacks   Everythings can trigger her sx  She is in lot of stress   Feels shaky with N when she wakes up  Her sx better during the day as she is busy with work  Spoke with her therapist , advised to get Rx from PCP   Denies SI/SA      The following portions of the patient's history were reviewed and updated as appropriate: allergies, current medications, past family history, past medical history, past social history, past surgical history, and problem list.        Review of Systems   Psychiatric/Behavioral:  Negative for decreased concentration and depressed mood. The patient is nervous/anxious.        Vitals:    10/20/23 1334   BP: 116/80   Pulse: 74   Resp: 16   Temp: 98 °F (36.7 °C)   SpO2: 95%           10/20/23  1334   Weight: 80.4 kg (177 lb 3.2 oz)         Body mass index is 25.8 kg/m².      Current Outpatient Medications   Medication Sig Dispense Refill    hydrOXYzine (ATARAX) 25 MG tablet Take 1 tablet by mouth 3 (Three) Times a Day As Needed for Anxiety. 30 tablet 2    levonorgestrel (KYLEENA) 19.5 MG intrauterine device IUD 1 each by Intrauterine route 1 (One) Time.      escitalopram (Lexapro) 10 MG tablet Take 1 tablet by mouth Daily. 60 tablet 1     No current facility-administered medications for this visit.                Objective   Physical Exam  Vitals and nursing note reviewed.   Constitutional:       General: She is not in acute distress.     Appearance: She is not toxic-appearing.   Cardiovascular:      Rate and Rhythm: Normal rate and regular rhythm.      Heart sounds: Normal heart sounds. No murmur heard.  Pulmonary:       Effort: Pulmonary effort is normal. No respiratory distress.      Breath sounds: Normal breath sounds. No stridor. No wheezing or rhonchi.   Neurological:      Mental Status: She is alert and oriented to person, place, and time.   Psychiatric:         Mood and Affect: Mood normal.         Behavior: Behavior normal.         Thought Content: Thought content normal.           Assessment & Plan   Diagnoses and all orders for this visit:    1. Anxiety (Primary)  -     escitalopram (Lexapro) 10 MG tablet; Take 1 tablet by mouth Daily.  Dispense: 60 tablet; Refill: 1    2. Panic attacks  -     Discontinue: hydrOXYzine (ATARAX) 25 MG tablet; Take 1 tablet by mouth 3 (Three) Times a Day As Needed for Itching.  Dispense: 30 tablet; Refill: 2  -     hydrOXYzine (ATARAX) 25 MG tablet; Take 1 tablet by mouth 3 (Three) Times a Day As Needed for Anxiety.  Dispense: 30 tablet; Refill: 2      Advised patient SSRI medication may take 4-6 weeks to notice an effect.  Discussed potential side effects including headache, dizziness, GI symptoms, sexual side effects, worsening mood or suicidal ideations.  Patient advised to call office for development of any side effects or questions. To ER for SI/HI.          Patient was given instructions and counseling regarding her condition or for health maintenance advice.   Please see specific information pulled into the AVS if appropriate.       I have fully discussed the nature of the medical condition(s) risks, complications, management, safe and proper use of medications.   Pt stated no allergy to the above prescribed medication.  I have discussed the SIDE EFFECT OF MEDICATION and importance TO report any side effect , the patient expressed good understanding.  Encouraged medication compliance and the importance of keeping scheduled follow up appointments with me and any other providers.    Patient instructed to follow up with our office for results on any labs/imaging ordered during this visit.     Home care discussed  All questions answered  Patient verbalizes understanding and agrees to treatment plan.     Follow up: Return in about 6 weeks (around 12/1/2023) for follow up on current illness.

## 2023-12-08 ENCOUNTER — OFFICE VISIT (OUTPATIENT)
Dept: FAMILY MEDICINE CLINIC | Facility: CLINIC | Age: 23
End: 2023-12-08
Payer: COMMERCIAL

## 2023-12-08 VITALS
HEART RATE: 82 BPM | TEMPERATURE: 97.6 F | DIASTOLIC BLOOD PRESSURE: 68 MMHG | HEIGHT: 69 IN | WEIGHT: 189.2 LBS | OXYGEN SATURATION: 98 % | RESPIRATION RATE: 16 BRPM | SYSTOLIC BLOOD PRESSURE: 100 MMHG | BODY MASS INDEX: 28.02 KG/M2

## 2023-12-08 DIAGNOSIS — F41.9 ANXIETY: Primary | ICD-10-CM

## 2023-12-08 DIAGNOSIS — F41.0 PANIC ATTACKS: ICD-10-CM

## 2023-12-08 RX ORDER — ESCITALOPRAM OXALATE 10 MG/1
10 TABLET ORAL DAILY
Qty: 90 TABLET | Refills: 1 | Status: SHIPPED | OUTPATIENT
Start: 2023-12-08

## 2023-12-08 NOTE — PROGRESS NOTES
Subjective     Sara Saul is a 23 y.o. female.     Chief Complaint   Patient presents with    Anxiety     6 week f/u    Panic Attack       History of Present Illness     Anxiety;doing much better with lexapro which started 6 weeks ago.   Her sx improved , first 2 weeks was tough, now she is tolerating dose very well.  Her appetite improved.   Takes atarax PRN for panic attacks. Less        The following portions of the patient's history were reviewed and updated as appropriate: allergies, current medications, past family history, past medical history, past social history, past surgical history, and problem list.        Review of Systems   Respiratory:  Negative for cough.    Psychiatric/Behavioral:  The patient is not nervous/anxious.        Vitals:    12/08/23 1555   BP: 100/68   Pulse: 82   Resp: 16   Temp: 97.6 °F (36.4 °C)   SpO2: 98%           12/08/23  1555   Weight: 85.8 kg (189 lb 3.2 oz)         Body mass index is 27.55 kg/m².      Current Outpatient Medications   Medication Sig Dispense Refill    escitalopram (Lexapro) 10 MG tablet Take 1 tablet by mouth Daily. 90 tablet 1    hydrOXYzine (ATARAX) 25 MG tablet Take 1 tablet by mouth 3 (Three) Times a Day As Needed for Anxiety. 30 tablet 2    levonorgestrel (KYLEENA) 19.5 MG intrauterine device IUD 1 each by Intrauterine route 1 (One) Time.       No current facility-administered medications for this visit.                Objective   Physical Exam  Vitals and nursing note reviewed.   Cardiovascular:      Rate and Rhythm: Normal rate and regular rhythm.      Heart sounds: Normal heart sounds. No murmur heard.  Pulmonary:      Effort: Pulmonary effort is normal. No respiratory distress.      Breath sounds: Normal breath sounds. No stridor. No wheezing or rhonchi.   Neurological:      Mental Status: She is alert and oriented to person, place, and time.   Psychiatric:         Mood and Affect: Mood normal.         Behavior: Behavior normal.         Thought  Content: Thought content normal.           Assessment & Plan   Diagnoses and all orders for this visit:    1. Anxiety (Primary)  Comments:  Stable, continue current Rx  Orders:  -     escitalopram (Lexapro) 10 MG tablet; Take 1 tablet by mouth Daily.  Dispense: 90 tablet; Refill: 1    2. Panic attacks  Comments:  Stable, continue current Rx PRN             Patient was given instructions and counseling regarding her condition or for health maintenance advice.   Please see specific information pulled into the AVS if appropriate.       I have fully discussed the nature of the medical condition(s) risks, complications, management, safe and proper use of medications.   Encouraged medication compliance and the importance of keeping scheduled follow up appointments with me and any other providers.    Patient instructed to follow up with our office for results on any labs/imaging ordered during this visit.    Home care discussed  All questions answered  Patient verbalizes understanding and agrees to treatment plan.     Follow up: Return in about 6 months (around 6/10/2024).

## 2024-02-20 ENCOUNTER — OFFICE VISIT (OUTPATIENT)
Dept: FAMILY MEDICINE CLINIC | Facility: CLINIC | Age: 24
End: 2024-02-20
Payer: COMMERCIAL

## 2024-02-20 VITALS
RESPIRATION RATE: 16 BRPM | OXYGEN SATURATION: 98 % | DIASTOLIC BLOOD PRESSURE: 76 MMHG | HEART RATE: 90 BPM | BODY MASS INDEX: 29.12 KG/M2 | WEIGHT: 196.6 LBS | SYSTOLIC BLOOD PRESSURE: 100 MMHG | HEIGHT: 69 IN | TEMPERATURE: 97.7 F

## 2024-02-20 DIAGNOSIS — Z11.59 NEED FOR HEPATITIS C SCREENING TEST: ICD-10-CM

## 2024-02-20 DIAGNOSIS — Z23 ENCOUNTER FOR IMMUNIZATION: ICD-10-CM

## 2024-02-20 DIAGNOSIS — E55.9 VITAMIN D DEFICIENCY: ICD-10-CM

## 2024-02-20 DIAGNOSIS — R10.30 LOWER ABDOMINAL PAIN: ICD-10-CM

## 2024-02-20 DIAGNOSIS — K59.09 OTHER CONSTIPATION: ICD-10-CM

## 2024-02-20 DIAGNOSIS — N39.0 ACUTE UTI: ICD-10-CM

## 2024-02-20 DIAGNOSIS — R53.82 CHRONIC FATIGUE: ICD-10-CM

## 2024-02-20 DIAGNOSIS — Z00.00 ENCOUNTER FOR ANNUAL PHYSICAL EXAM: Primary | ICD-10-CM

## 2024-02-20 DIAGNOSIS — Z13.220 LIPID SCREENING: ICD-10-CM

## 2024-02-20 DIAGNOSIS — F41.9 ANXIETY: ICD-10-CM

## 2024-02-20 RX ORDER — AMOXICILLIN 500 MG/1
500 CAPSULE ORAL 2 TIMES DAILY
Qty: 10 CAPSULE | Refills: 0 | Status: SHIPPED | OUTPATIENT
Start: 2024-02-20 | End: 2024-02-25

## 2024-02-20 NOTE — PROGRESS NOTES
Patient Care Team:  Gloria Escalona MD as PCP - General (Urgent Care)     Chief complaint: Patient is in today for a physical          Patient is a 23 y.o. female who presents for her yearly physical exam.     HPI     C/o Lower abd pain, on/off , Lt > Rt side , cramps, 9/10, last for 2-3 hrs, feels fatigue,  denies N/V/F/C.  Apply heating pad, helped a lot. Has dark color urine no dysuria   Has IUD , expi in April . Has some vag spotting , on/off    Anxiety;  well controlled with current Rx, no S/E reported.     Constipation ; hard to pass stool, Drinks water plenty   Eating RD .   Immunizations: reviewed and discussed.       Health maintenance/lifestyle:  Immunization History   Administered Date(s) Administered    COVID-19 (PFIZER) Purple Cap Monovalent 05/18/2021, 06/08/2021    Flu Vaccine Quad PF >36MO 11/03/2018    Flublok 18+yrs 09/20/2019, 10/01/2021    Fluzone (or Fluarix & Flulaval for VFC) >6mos 11/03/2018, 09/28/2022, 02/20/2024    HPV Quadrivalent 08/10/2020    Hep B, Unspecified 2000, 2000, 02/01/2002    Hepatitis A 05/25/2018    Hpv9 10/15/2020, 07/27/2021    Influenza, Unspecified 09/20/2019    MMR 06/19/2001, 02/04/2005    Meningococcal MCV4P (Menactra) 05/25/2018    Meningococcal, Unspecified 08/16/2011    Tdap 08/16/2011, 09/28/2022    Varicella 06/10/2003, 08/16/2011          PHQ-2 Depression Screening  Little interest or pleasure in doing things? 0-->not at all   Feeling down, depressed, or hopeless? 0-->not at all   PHQ-2 Total Score 0         Social History     Tobacco Use   Smoking Status Never   Smokeless Tobacco Never     Social History     Substance and Sexual Activity   Alcohol Use No         Review of Systems   Constitutional:  Positive for fatigue.   Gastrointestinal:  Positive for abdominal pain and constipation.   Genitourinary:  Positive for vaginal bleeding.         History  Past Medical History:   Diagnosis Date    Breast lump in female 2019    RIGHT       Past  "Surgical History:   Procedure Laterality Date    EYE SURGERY      EYE SURGERY      AGE 3    INTRAUTERINE DEVICE INSERTION      LASIK      WISDOM TOOTH EXTRACTION        No Known Allergies   Family History   Problem Relation Age of Onset    Breast cancer Neg Hx     Ovarian cancer Neg Hx     Uterine cancer Neg Hx     Colon cancer Neg Hx      Social History     Socioeconomic History    Marital status: Single   Tobacco Use    Smoking status: Never    Smokeless tobacco: Never   Substance and Sexual Activity    Alcohol use: No    Drug use: No    Sexual activity: Yes     Partners: Male     Birth control/protection: I.U.D.     Comment: KYLEENA 05/10/19        Current Outpatient Medications:     escitalopram (Lexapro) 10 MG tablet, Take 1 tablet by mouth Daily., Disp: 90 tablet, Rfl: 1    hydrOXYzine (ATARAX) 25 MG tablet, Take 1 tablet by mouth 3 (Three) Times a Day As Needed for Anxiety., Disp: 30 tablet, Rfl: 2    levonorgestrel (KYLEENA) 19.5 MG intrauterine device IUD, 1 each by Intrauterine route 1 (One) Time., Disp: , Rfl:     amoxicillin (AMOXIL) 500 MG capsule, Take 1 capsule by mouth 2 (Two) Times a Day for 5 days., Disp: 10 capsule, Rfl: 0                  /76   Pulse 90   Temp 97.7 °F (36.5 °C)   Resp 16   Ht 176.5 cm (69.49\")   Wt 89.2 kg (196 lb 9.6 oz)   SpO2 98%   BMI 28.63 kg/m²       Physical Exam  Vitals and nursing note reviewed.   Constitutional:       General: She is not in acute distress.     Appearance: She is not ill-appearing, toxic-appearing or diaphoretic.   HENT:      Head: Normocephalic.      Mouth/Throat:      Mouth: Mucous membranes are moist.      Pharynx: Oropharynx is clear.   Eyes:      Conjunctiva/sclera: Conjunctivae normal.   Neck:      Comments: No enlarged thyroid      Cardiovascular:      Rate and Rhythm: Normal rate and regular rhythm.      Heart sounds: Normal heart sounds. No murmur heard.     No friction rub. No gallop.   Pulmonary:      Effort: Pulmonary effort is " normal. No respiratory distress.      Breath sounds: Normal breath sounds. No stridor. No wheezing, rhonchi or rales.   Abdominal:      General: Bowel sounds are normal. There is no distension.      Palpations: Abdomen is soft. There is no mass.      Tenderness: There is abdominal tenderness. There is no right CVA tenderness, left CVA tenderness, guarding or rebound.      Hernia: No hernia is present.      Comments: MILD TTP OVER LLQ AND RLQ   Musculoskeletal:         General: Normal range of motion.      Cervical back: Normal range of motion and neck supple.      Right lower leg: No edema.      Left lower leg: No edema.   Lymphadenopathy:      Cervical: No cervical adenopathy.   Skin:     General: Skin is warm.      Coloration: Skin is not jaundiced or pale.   Neurological:      General: No focal deficit present.      Mental Status: She is alert and oriented to person, place, and time.      Gait: Gait normal.   Psychiatric:         Mood and Affect: Mood normal.         Behavior: Behavior normal.         Thought Content: Thought content normal.         Judgment: Judgment normal.                   Diagnoses and all orders for this visit:    1. Encounter for annual physical exam (Primary)  Comments:  will check labs   discused diet and exercise    2. Chronic fatigue  Comments:  needs W/U  Orders:  -     TSH Rfx On Abnormal To Free T4  -     CBC (No Diff)  -     Comprehensive Metabolic Panel  -     Vitamin B12    3. Lower abdominal pain  Comments:  ddx; uti , constipation , IUD vs OVARIAN CYSTS/DIS vs others  Orders:  -     POC Urinalysis Dipstick, Automated    4. Acute UTI  Comments:  close monitoring  Orders:  -     amoxicillin (AMOXIL) 500 MG capsule; Take 1 capsule by mouth 2 (Two) Times a Day for 5 days.  Dispense: 10 capsule; Refill: 0  -     Urine Culture - Urine, Urine, Clean Catch    5. Other constipation  Comments:  discussed diet + Mirilax from OTC    6. Need for hepatitis C screening test  -     Hepatitis C  Antibody    7. Vitamin D deficiency  -     Vitamin D,25-Hydroxy    8. Lipid screening  -     Lipid Panel    9. Encounter for immunization  -     Fluzone (or Fluarix & Flulaval for VFC) >6 Mos ([id number])    10. Anxiety  Comments:  Stable, continue current Rx        -Age and sex appropriate physical exam performed and documented.   -Updated past medical, family, social and surgical histories as well as allergies and care team list.   -Addressed care gaps listed in the medical record.  -advised to eat healthy diet, do daily exercise   - discussed and updates preventive screening measures           Follow up: Return in about 1 week (around 2/27/2024) for follow up on current illness.  Plan of care discussed with pt. They verbalized understanding and agreement.     Gloria Escalona MD   2/25/2024   14:41 EST

## 2024-02-21 ENCOUNTER — TELEPHONE (OUTPATIENT)
Facility: HOSPITAL | Age: 24
End: 2024-02-21
Payer: COMMERCIAL

## 2024-02-21 LAB
25(OH)D3+25(OH)D2 SERPL-MCNC: 17.6 NG/ML (ref 30–100)
ALBUMIN SERPL-MCNC: 4.6 G/DL (ref 4–5)
ALBUMIN/GLOB SERPL: 1.9 {RATIO} (ref 1.2–2.2)
ALP SERPL-CCNC: 64 IU/L (ref 44–121)
ALT SERPL-CCNC: 8 IU/L (ref 0–32)
AST SERPL-CCNC: 15 IU/L (ref 0–40)
BILIRUB SERPL-MCNC: 0.6 MG/DL (ref 0–1.2)
BUN SERPL-MCNC: 12 MG/DL (ref 6–20)
BUN/CREAT SERPL: 14 (ref 9–23)
CALCIUM SERPL-MCNC: 9.5 MG/DL (ref 8.7–10.2)
CHLORIDE SERPL-SCNC: 104 MMOL/L (ref 96–106)
CHOLEST SERPL-MCNC: 157 MG/DL (ref 100–199)
CO2 SERPL-SCNC: 22 MMOL/L (ref 20–29)
CREAT SERPL-MCNC: 0.85 MG/DL (ref 0.57–1)
EGFRCR SERPLBLD CKD-EPI 2021: 99 ML/MIN/1.73
ERYTHROCYTE [DISTWIDTH] IN BLOOD BY AUTOMATED COUNT: 12.4 % (ref 11.7–15.4)
GLOBULIN SER CALC-MCNC: 2.4 G/DL (ref 1.5–4.5)
GLUCOSE SERPL-MCNC: 99 MG/DL (ref 70–99)
HCT VFR BLD AUTO: 45.5 % (ref 34–46.6)
HCV IGG SERPL QL IA: NON REACTIVE
HDLC SERPL-MCNC: 51 MG/DL
HGB BLD-MCNC: 15.2 G/DL (ref 11.1–15.9)
LDLC SERPL CALC-MCNC: 85 MG/DL (ref 0–99)
MCH RBC QN AUTO: 29.7 PG (ref 26.6–33)
MCHC RBC AUTO-ENTMCNC: 33.4 G/DL (ref 31.5–35.7)
MCV RBC AUTO: 89 FL (ref 79–97)
PLATELET # BLD AUTO: 282 X10E3/UL (ref 150–450)
POTASSIUM SERPL-SCNC: 4.3 MMOL/L (ref 3.5–5.2)
PROT SERPL-MCNC: 7 G/DL (ref 6–8.5)
RBC # BLD AUTO: 5.11 X10E6/UL (ref 3.77–5.28)
SODIUM SERPL-SCNC: 141 MMOL/L (ref 134–144)
TRIGL SERPL-MCNC: 119 MG/DL (ref 0–149)
TSH SERPL DL<=0.005 MIU/L-ACNC: 1.97 UIU/ML (ref 0.45–4.5)
VIT B12 SERPL-MCNC: 530 PG/ML (ref 232–1245)
VLDLC SERPL CALC-MCNC: 21 MG/DL (ref 5–40)
WBC # BLD AUTO: 6.4 X10E3/UL (ref 3.4–10.8)

## 2024-02-22 ENCOUNTER — TELEPHONE (OUTPATIENT)
Dept: FAMILY MEDICINE CLINIC | Facility: CLINIC | Age: 24
End: 2024-02-22
Payer: COMMERCIAL

## 2024-02-22 LAB
BACTERIA UR CULT: NORMAL
BACTERIA UR CULT: NORMAL

## 2024-02-22 NOTE — TELEPHONE ENCOUNTER
Spoke with patient. Patient v/u and will contact OB/Gyn office for appointment. Advised patient to contact office if she needed any help expediting appointment.

## 2024-02-22 NOTE — TELEPHONE ENCOUNTER
----- Message from Gloria Escalona MD sent at 2/22/2024 10:39 AM EST -----  PLEASE call for urine cx results, did not show bacterial growth

## 2024-02-22 NOTE — TELEPHONE ENCOUNTER
Patient is aware of urine results. Patient is still experiencing abdominal pain, not as bad as last week and notices blood on toilet tissue after she wipes, after she urinates. Patient did reach out to her OB/Gyn, but has not been able to schedule an appointment as of yet. Please advise.

## 2024-02-22 NOTE — TELEPHONE ENCOUNTER
I do not think her abd pain or blood when she wipes from her urinary tract, she needs to get GYN check .

## 2024-02-22 NOTE — TELEPHONE ENCOUNTER
Dr looney ,   I spoke with Sara , she had couple of episodes of abdominal cramping with last week and started bleeding too . Since the insertion patient reports not having periods with IUD . She is scheduled in April for IUD removal and reinsertion she wants to know if would be beneficial to have the IUD switched sooner than April . If so is it ok to schedule her with NP if you don't have any appointments available ?  Please advise ?   She is aware you out of the office today .

## 2024-02-22 NOTE — TELEPHONE ENCOUNTER
"Left vm informing pt    Relay     \"urine culture resulted normally, did not show bacterial growth. Will discuss blood work at upcoming appt\"                "

## 2024-02-27 ENCOUNTER — OFFICE VISIT (OUTPATIENT)
Dept: FAMILY MEDICINE CLINIC | Facility: CLINIC | Age: 24
End: 2024-02-27
Payer: COMMERCIAL

## 2024-02-27 VITALS
TEMPERATURE: 97.6 F | DIASTOLIC BLOOD PRESSURE: 76 MMHG | SYSTOLIC BLOOD PRESSURE: 100 MMHG | HEIGHT: 69 IN | BODY MASS INDEX: 29.18 KG/M2 | OXYGEN SATURATION: 99 % | HEART RATE: 80 BPM | RESPIRATION RATE: 16 BRPM | WEIGHT: 197 LBS

## 2024-02-27 DIAGNOSIS — E55.9 VITAMIN D DEFICIENCY: ICD-10-CM

## 2024-02-27 DIAGNOSIS — R53.82 CHRONIC FATIGUE: Primary | ICD-10-CM

## 2024-02-27 DIAGNOSIS — K59.09 OTHER CONSTIPATION: ICD-10-CM

## 2024-02-27 DIAGNOSIS — R10.30 LOWER ABDOMINAL PAIN: ICD-10-CM

## 2024-02-27 PROCEDURE — 99214 OFFICE O/P EST MOD 30 MIN: CPT | Performed by: FAMILY MEDICINE

## 2024-02-27 RX ORDER — ERGOCALCIFEROL 1.25 MG/1
50000 CAPSULE ORAL WEEKLY
Qty: 12 CAPSULE | Refills: 1 | Status: SHIPPED | OUTPATIENT
Start: 2024-02-27

## 2024-02-27 NOTE — PROGRESS NOTES
Subjective     Sara Saul is a 23 y.o. female.     Chief Complaint   Patient presents with    Chronic fatigue     1 week f/u    Abdominal Pain     Lower area    Constipation    discuss labs        History of Present Illness     C/o Lower abd pain, on/off , Lt > Rt side , cramps, 5-9/10, last for 2-3 hrs, feels fatigue and tired all the time, not depressed, has constipation, passing hard stool 1-2 x /week  Apply heating pad, helped a lot. Has dark color urine no dysuria   Denies N/V/F/C.  Has IUD , expi in April . Has some vag spotting , on/off  Since last OV , she starts taking Mirilax and drinks more water, her abd pain improved and constipation little improved as well.       Labs has been ordered and personally/independently interpreted , discussed with pt   Showed normal lipid /TSH/cbc/CMP/B12  Her vit d was low ;  Vit D 17.6    Lab Results   Component Value Date    WBC 6.4 02/20/2024    HGB 15.2 02/20/2024    HCT 45.5 02/20/2024    MCV 89 02/20/2024     02/20/2024         Lab Results   Component Value Date    GLUCOSE 99 02/20/2024    BUN 12 02/20/2024    CREATININE 0.85 02/20/2024    EGFRRESULT 99 02/20/2024    BCR 14 02/20/2024    K 4.3 02/20/2024    CO2 22 02/20/2024    CALCIUM 9.5 02/20/2024    PROTENTOTREF 7.0 02/20/2024    ALBUMIN 4.6 02/20/2024    BILITOT 0.6 02/20/2024    AST 15 02/20/2024    ALT 8 02/20/2024     Lab Results   Component Value Date    TSH 1.970 02/20/2024     Lab Results   Component Value Date    CVNYKOET12 530 02/20/2024     Lab Results   Component Value Date    CHLPL 157 02/20/2024    TRIG 119 02/20/2024    HDL 51 02/20/2024    LDL 85 02/20/2024       The following portions of the patient's history were reviewed and updated as appropriate: allergies, current medications, past family history, past medical history, past social history, past surgical history, and problem list.        Review of Systems   Constitutional:  Positive for fatigue.   Gastrointestinal:  Positive for  constipation.       Vitals:    02/27/24 1257   BP: 100/76   Pulse: 80   Resp: 16   Temp: 97.6 °F (36.4 °C)   SpO2: 99%           02/27/24  1257   Weight: 89.4 kg (197 lb)         Body mass index is 28.68 kg/m².      Current Outpatient Medications   Medication Sig Dispense Refill    escitalopram (Lexapro) 10 MG tablet Take 1 tablet by mouth Daily. 90 tablet 1    hydrOXYzine (ATARAX) 25 MG tablet Take 1 tablet by mouth 3 (Three) Times a Day As Needed for Anxiety. 30 tablet 2    levonorgestrel (KYLEENA) 19.5 MG intrauterine device IUD 1 each by Intrauterine route 1 (One) Time.      vitamin D (ERGOCALCIFEROL) 1.25 MG (74892 UT) capsule capsule Take 1 capsule by mouth 1 (One) Time Per Week. 12 capsule 1     No current facility-administered medications for this visit.                Objective   Physical Exam  Vitals and nursing note reviewed.   Constitutional:       General: She is not in acute distress.     Appearance: She is not toxic-appearing.   Abdominal:      General: Bowel sounds are normal. There is no distension.      Palpations: Abdomen is soft.      Tenderness: There is no abdominal tenderness. There is no guarding or rebound.   Neurological:      Mental Status: She is alert and oriented to person, place, and time.   Psychiatric:         Mood and Affect: Mood normal.         Behavior: Behavior normal.         Thought Content: Thought content normal.           Assessment & Plan   Diagnoses and all orders for this visit:    1. Chronic fatigue (Primary)  Comments:  albs are all ok execpt for low vit d    2. Lower abdominal pain  Comments:  imporved , continue monioring and Rx constipation    3. Other constipation  Comments:  improved with daily mirilax and more fluids    4. Vitamin D deficiency  Comments:  discussed diet as below and sunexposure   will start on Vit D weekly  Orders:  -     vitamin D (ERGOCALCIFEROL) 1.25 MG (47800 UT) capsule capsule; Take 1 capsule by mouth 1 (One) Time Per Week.  Dispense: 12  capsule; Refill: 1      Recommended to get sensible sun exposure.  Exposure of the arms and legs (with sun protection on the face) for about 5 to 30 minutes between 10 am and 3 pm twice a week is recommended.   Patient is recommended to increase their dietary intake of vit D (fish oil, cod liver, mushrooms & salmon) and calcium (shrimp, salmon, turnip greens, kale, broccoli, tofu) rich foods.   Recommended dietary calcium intake should be approximately 1,000-1,200 mg daily.       Patient was given instructions and counseling regarding her condition or for health maintenance advice.   Please see specific information pulled into the AVS if appropriate.       I have fully discussed the nature of the medical condition(s) risks, complications, management, safe and proper use of medications.   Pt stated no allergy to the above prescribed medication.  I have discussed the SIDE EFFECT OF MEDICATION and importance TO report any side effect , the patient expressed good understanding.  Encouraged medication compliance and the importance of keeping scheduled follow up appointments with me and any other providers.    Patient instructed to follow up with our office for results on any labs/imaging ordered during this visit.    Home care discussed  All questions answered  Patient verbalizes understanding and agrees to treatment plan.     Follow up: Return in about 4 months (around 6/27/2024).

## 2024-03-01 ENCOUNTER — OFFICE VISIT (OUTPATIENT)
Dept: OBSTETRICS AND GYNECOLOGY | Age: 24
End: 2024-03-01
Payer: COMMERCIAL

## 2024-03-01 VITALS
SYSTOLIC BLOOD PRESSURE: 102 MMHG | HEIGHT: 69 IN | WEIGHT: 197 LBS | DIASTOLIC BLOOD PRESSURE: 70 MMHG | BODY MASS INDEX: 29.18 KG/M2

## 2024-03-01 DIAGNOSIS — N89.8 VAGINAL ODOR: ICD-10-CM

## 2024-03-01 DIAGNOSIS — F41.9 ANXIETY: ICD-10-CM

## 2024-03-01 DIAGNOSIS — Z30.433 ENCOUNTER FOR IUD REMOVAL AND REINSERTION: Primary | ICD-10-CM

## 2024-03-01 DIAGNOSIS — Z30.431 IUD CHECK UP: ICD-10-CM

## 2024-03-01 DIAGNOSIS — Z30.432 ENCOUNTER FOR IUD REMOVAL: ICD-10-CM

## 2024-03-01 LAB
B-HCG UR QL: NEGATIVE
EXPIRATION DATE: NORMAL
INTERNAL NEGATIVE CONTROL: NEGATIVE
INTERNAL POSITIVE CONTROL: POSITIVE
Lab: NORMAL

## 2024-03-01 NOTE — PROGRESS NOTES
IUD Removal and Immediate Reinsertion    No LMP recorded (lmp unknown). Patient has had an implant.    Date of procedure:  3/1/2024    Risks and benefits discussed? yes  All questions answered? yes  Consents given by the patient  Written consent obtained? yes  Reason for removal: Side effect: bleeding and Device expiration    Kyleena  Lot [id number]  Exp 9/2024    Procedure documentation:    A speculum was placed in order to view the cervix.  A tenaculum did need to be placed on the anterior cervical lip.  Cervical dilation did not need to be performed in order to access the string.  The IUD string was easily seen.  The string was grasped and the IUD was removed without difficulty.  The IUD did not appear to be adherent to the uterine cavity. It was removed intact.    A sterile speculum was replaced and the cervix was cleansed with an antiseptic solution.  Vaginal discharge was scant.  The anterior lip of the cervix was grasped with a tenaculum and the uterine cavity was gently sounded.  There was no difficulty passing the sound through the cervix.  Cervical dilation did not need to be performed prior to placing the IUD.  The uterus was anteverted and sounded to 6 cms.  The Kyleena was then prepared per the manufacturers instructions.    The Kyleena was advanced to a point 2 cms from the fundus and then the arms were released from the sheath.  The device was advanced to the fundus and the device was released fully from the sheath.. The string was cut 3 cms in length.  Bleeding from the cervix was scant.    She tolerated the procedure.    TVUS with iud in uterus near middle   Discussed options of removing and reinsertion or leaving in place  She decided to have iud removed      Post procedure instructions: Call if any fever or excessive bleeding or pain.    Follow up needed: 6 weeks for IUD check    IUD Removal Procedure Note    Type of IUD:  Kyleena   Date of insertion:  3/1/24  Reason for removal:  Side effect: pain  "    Procedure Time Documentation  The risks of the procedure were reviewed with the patient including bleeding, infection and unlikely damage to the uterus and the benefits of the procedure were explained to the patient and  informed consent was obtained    Procedure Details  IUD strings visible:  yes  Removal:  IUD strings grasped and IUD removed intact with gentle traction.  The patient tolerated the procedure well.    All appropriate instructions regarding removal were reviewed.    Patient tolerated the procedure well.    Plans for contraception:  condoms  Follow up PRN  Patient was advised to call for fever, prolonged/severe pain, or prolonged bleeding  She was advised to use NSAIDs for mild to moderate pain    Procedure time: 7 minutes    Jenni OBRIEN Sharron, APRN  24  13:14 EST  Subjective     Chief Complaint   Patient presents with    Procedure     Kyleena removal and reinsert       Sara Saul is a 23 y.o.  whose LMP is No LMP recorded (lmp unknown). Patient has had an implant. presents with iud removal and reinsertion  Has some vaginal odor  After iud insertion she is having some cramping      The following portions of the patient's history were reviewed and updated as appropriate:vital signs, allergies, current medications, past medical history, past social history, past surgical history, and problem list      Review of Systems   Pertinent items are noted in HPI.     Objective      /70   Ht 176.5 cm (69.49\")   Wt 89.4 kg (197 lb)   LMP  (LMP Unknown)   BMI 28.68 kg/m²     Physical Exam    General:   alert and anxious   Heart: Not performed today   Lungs: Not performed today.   Breast: Not performed today   Neck: Not performed today   Abdomen: Not performed today   CVA: Not performed today   Pelvis: Vaginal: discharge, odor and presence of blood   Extremities: Extremities normal, atraumatic, no cyanosis or edema   Neurologic: AOx3. Gait normal. Reflexes and motor strength normal " and symmetric. Cranial nerves 2-12 and sensation grossly intact.   Psychiatric: anxious       Lab Review   Labs: No data reviewed    Imaging   Ultrasound - Pelvic Vaginal    Assessment & Plan     ASSESSMENT  1. Encounter for IUD removal and reinsertion    2. IUD check up    3. Encounter for IUD removal    4. Anxiety    5. Vaginal odor          PLAN  1.   Orders Placed This Encounter   Procedures    NuSwab VG+ - Swab, Vagina    POC Pregnancy, Urine       2. Medications prescribed this encounter:        New Medications Ordered This Visit   Medications    levonorgestrel (KYLEENA) 19.5 MG IUD 1 each       3. Iud removal and reinsertion with removal again pt had anxiety with procedure  Removed and nuswab collected to r/o infection  Will return with Dr Delatorre for reinsertion  I would recommend hydroxyzine before procedure  Offered back up method condoms recommended     Follow up: 2-4 weeks     Jenni Mcdermott, APRN  3/1/2024

## 2024-03-05 DIAGNOSIS — B96.89 BV (BACTERIAL VAGINOSIS): ICD-10-CM

## 2024-03-05 DIAGNOSIS — A74.9 CHLAMYDIA: Primary | ICD-10-CM

## 2024-03-05 DIAGNOSIS — N76.0 BV (BACTERIAL VAGINOSIS): ICD-10-CM

## 2024-03-05 RX ORDER — DOXYCYCLINE HYCLATE 100 MG/1
100 CAPSULE ORAL 2 TIMES DAILY
Qty: 14 CAPSULE | Refills: 0 | Status: SHIPPED | OUTPATIENT
Start: 2024-03-05 | End: 2024-03-12

## 2024-03-05 RX ORDER — METRONIDAZOLE 500 MG/1
500 TABLET ORAL 2 TIMES DAILY
Qty: 14 TABLET | Refills: 0 | Status: SHIPPED | OUTPATIENT
Start: 2024-03-05 | End: 2024-03-12

## 2024-03-29 ENCOUNTER — OFFICE VISIT (OUTPATIENT)
Dept: OBSTETRICS AND GYNECOLOGY | Age: 24
End: 2024-03-29
Payer: COMMERCIAL

## 2024-03-29 VITALS
BODY MASS INDEX: 29.18 KG/M2 | DIASTOLIC BLOOD PRESSURE: 70 MMHG | SYSTOLIC BLOOD PRESSURE: 118 MMHG | HEIGHT: 69 IN | WEIGHT: 197 LBS

## 2024-03-29 DIAGNOSIS — Z01.812 PRE-PROCEDURE LAB EXAM: ICD-10-CM

## 2024-03-29 DIAGNOSIS — Z30.430 ENCOUNTER FOR IUD INSERTION: Primary | ICD-10-CM

## 2024-03-29 NOTE — PROGRESS NOTES
IUD Insertion Procedure Note    No LMP recorded.    Date of procedure:  8/25/2023    Risks and benefits discussed? yes  All questions answered? yes  Consents given by the patient  Written consent obtained? yes  Time out ? Yes  Indication: Desires long acting reversible contraception     Procedure Details   Urine pregnancy test confirmed negative.  The risks (including infection, bleeding, pain, and uterine perforation) and benefits of the procedure were explained to the patient and verbal informed consent was obtained.      Cervix was cleansed with Betadine. Allis clamp applied to anterior cervix.  Uterus sounded to 7 cm. IUD inserted without difficulty. Strings trimmed to 2-3 cm.    IUD Information:  Kyleena, Lot # [id number], Expiration date 9/2024.    Condition:  Stable    Complications:  None, patient tolerated the procedure well    Plan:  The patient was advised to call for fever, prolonged or severe pain, or persistent bleeding. She was advised to use NSAIDs as needed for mild to moderate pain.  Discussed using back up form of contraception for 1 week.  Discussed expectation of irregular bleeding for 3-6 months but then generally resolves.  Follow up PRN.    Will return in 2 weeks for SCOTT    Procedure time: 7 minutes    Impression:    1.  Uterus: Normal size and Anteverted    2.  Endometrium: Normal non-menopausal thickness, 6.9 mm , and IUD appears in normal position     3.  Myometrium: Normal homogenous texture     4.  Ovaries   Left: Not visualized    Right: Normal/unremarkable       Jenni Mcdermott, APRN  03/29/24  12:20 EDT

## 2024-04-12 ENCOUNTER — OFFICE VISIT (OUTPATIENT)
Dept: OBSTETRICS AND GYNECOLOGY | Age: 24
End: 2024-04-12
Payer: COMMERCIAL

## 2024-04-12 VITALS
BODY MASS INDEX: 30.66 KG/M2 | WEIGHT: 207 LBS | SYSTOLIC BLOOD PRESSURE: 120 MMHG | HEIGHT: 69 IN | DIASTOLIC BLOOD PRESSURE: 68 MMHG

## 2024-04-12 DIAGNOSIS — Z11.3 SCREENING FOR STD (SEXUALLY TRANSMITTED DISEASE): Primary | ICD-10-CM

## 2024-04-12 DIAGNOSIS — Z97.5 IUD (INTRAUTERINE DEVICE) IN PLACE: ICD-10-CM

## 2024-04-12 DIAGNOSIS — L72.3 SEBACEOUS CYST: ICD-10-CM

## 2024-04-12 NOTE — PROGRESS NOTES
"Subjective     Chief Complaint   Patient presents with   • Follow-up     SCOTT       Sara Saul is a 23 y.o.  whose LMP is No LMP recorded (lmp unknown). Patient has had an implant. presents with follow up appointment  She needs a test of cure today  Completed all her medication  No s/s today  Doing well with IUD  Has a small sebaceous cyst near her panty line has been squeezing the area          The following portions of the patient's history were reviewed and updated as appropriate:vital signs, allergies, current medications, past medical history, past social history, past surgical history, and problem list      Review of Systems   Pertinent items are noted in HPI.     Objective      /68   Ht 176.5 cm (69.49\")   Wt 93.9 kg (207 lb)   LMP  (LMP Unknown)   BMI 30.14 kg/m²     Physical Exam  Genitourinary:         Comments: Iud strings per os  Small sebaceous cyst    General:   alert   Heart: Not performed today   Lungs: Not performed today.   Breast: Not performed today   Neck: Not performed today   Abdomen: Not performed today   CVA: Not performed today   Pelvis: Vulva and vagina appear normal. Cervix: IUD string visualized small sebaceous cyst not to left groin fold   Extremities: Extremities normal, atraumatic, no cyanosis or edema   Neurologic: AOx3. Gait normal. Reflexes and motor strength normal and symmetric. Cranial nerves 2-12 and sensation grossly intact.   Psychiatric: Normal affect, judgement, and mood       Lab Review   Labs: GC / Chlamydia DNA probe of cervico/vaginal secretions    Imaging   No data reviewed    Assessment & Plan     ASSESSMENT  1. Screening for STD (sexually transmitted disease)    2. IUD (intrauterine device) in place    3. Sebaceous cyst          PLAN  1.   Orders Placed This Encounter   Procedures   • NuSwab VG+ - Swab, Vagina       2. Medications prescribed this encounter:      No orders of the defined types were placed in this encounter.      3. Iud strings " visualized  Nuswab for selena  Discussed sebaceous cyst do not squeeze, use a warm compress   Call if any worsening sx   All questions answered and addressed    Follow up: as needed    Jenni Mcdermott, APRN  4/12/2024

## 2024-04-15 ENCOUNTER — TELEPHONE (OUTPATIENT)
Dept: OBSTETRICS AND GYNECOLOGY | Age: 24
End: 2024-04-15
Payer: COMMERCIAL

## 2024-04-15 NOTE — TELEPHONE ENCOUNTER
----- Message from Sara Saul sent at 4/15/2024  1:06 PM EDT -----  Regarding: Bleeding with IUD  Contact: [phone]  I am currently bleeding very bad that would contain like my period. I am just very worried on if that’s normal for having my new one inserted less than a month ago. This is the first time of bleeding since the failed attempt. I did have sex yesterday as I am now worried for side effects.

## 2024-05-14 ENCOUNTER — OFFICE VISIT (OUTPATIENT)
Dept: OBSTETRICS AND GYNECOLOGY | Age: 24
End: 2024-05-14
Payer: COMMERCIAL

## 2024-05-14 VITALS
DIASTOLIC BLOOD PRESSURE: 68 MMHG | SYSTOLIC BLOOD PRESSURE: 122 MMHG | WEIGHT: 207 LBS | HEIGHT: 69 IN | BODY MASS INDEX: 30.66 KG/M2

## 2024-05-14 DIAGNOSIS — Z97.5 IUD (INTRAUTERINE DEVICE) IN PLACE: Primary | ICD-10-CM

## 2024-05-14 PROCEDURE — 99213 OFFICE O/P EST LOW 20 MIN: CPT

## 2024-05-14 NOTE — PROGRESS NOTES
"Subjective     Chief Complaint   Patient presents with    Follow-up     6 wk string check       Sara Saul is a 23 y.o.  whose LMP is No LMP recorded (lmp unknown). Patient has had an implant. 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     Objective      /68   Ht 176.5 cm (69.49\")   Wt 93.9 kg (207 lb)   LMP  (LMP Unknown)   BMI 30.14 kg/m²     Physical Exam    General:   alert   Heart: Not performed today   Lungs: Not performed today.   Breast: Not performed today   Neck: Not performed today   Abdomen: Not performed today   CVA: Not performed today   Pelvis: Vaginal: normal mucosa without prolapse or lesions  Cervix: IUD string visualized   Extremities: Extremities normal, atraumatic, no cyanosis or edema   Neurologic: AOx3. Gait normal. Reflexes and motor strength normal and symmetric. Cranial nerves 2-12 and sensation grossly intact.   Psychiatric: Normal affect, judgement, and mood       Lab Review   Labs: No data reviewed    Imaging   No data reviewed    Assessment & Plan     ASSESSMENT  1. IUD (intrauterine device) in place          PLAN  1. No orders of the defined types were placed in this encounter.      2. Medications prescribed this encounter:      No orders of the defined types were placed in this encounter.      3. Iud strings visible at cervical os  No " concerns  Return for ae in October   UTD for sti testing   All questions answered and addressed    Follow up: 5 month(s)    Jenni Mcdermott, NICOLE  5/14/2024

## 2024-06-05 ENCOUNTER — OFFICE VISIT (OUTPATIENT)
Dept: OBSTETRICS AND GYNECOLOGY | Age: 24
End: 2024-06-05
Payer: COMMERCIAL

## 2024-06-05 VITALS
HEIGHT: 69 IN | DIASTOLIC BLOOD PRESSURE: 64 MMHG | SYSTOLIC BLOOD PRESSURE: 120 MMHG | WEIGHT: 207 LBS | BODY MASS INDEX: 30.66 KG/M2

## 2024-06-05 DIAGNOSIS — N89.8 VAGINAL IRRITATION: ICD-10-CM

## 2024-06-05 DIAGNOSIS — N94.9 VAGINAL BURNING: ICD-10-CM

## 2024-06-05 DIAGNOSIS — N89.8 VAGINAL ITCHING: Primary | ICD-10-CM

## 2024-06-05 PROCEDURE — 99214 OFFICE O/P EST MOD 30 MIN: CPT

## 2024-06-05 RX ORDER — NYSTATIN 100000 U/G
1 CREAM TOPICAL 2 TIMES DAILY
Qty: 15 G | Refills: 0 | Status: SHIPPED | OUTPATIENT
Start: 2024-06-05

## 2024-06-05 RX ORDER — FLUCONAZOLE 150 MG/1
150 TABLET ORAL DAILY
Qty: 3 TABLET | Refills: 1 | Status: SHIPPED | OUTPATIENT
Start: 2024-06-05

## 2024-06-05 NOTE — PROGRESS NOTES
"Subjective     Chief Complaint   Patient presents with    Vaginal Pain     Poss yeast infection, itching burning and irritation       Sara Saul is a 23 y.o.  whose LMP is No LMP recorded (lmp unknown). Patient has had an implant. presents with vaginal irritation, itching and burning that started about a week ago  It has progressively gotten worse  She reports possibly using a new condom  Has changed body wash to a non scented formulation   She is itching at night     The following portions of the patient's history were reviewed and updated as appropriate:vital signs, allergies, current medications, past medical history, past social history, past surgical history, and problem list      Review of Systems   Pertinent items are noted in HPI.     Objective      /64   Ht 176.5 cm (69.49\")   Wt 93.9 kg (207 lb)   LMP  (LMP Unknown)   BMI 30.14 kg/m²     Physical Exam  Genitourinary:     Labia:         Right: Tenderness present.         Left: Tenderness present.       Vagina: Vaginal discharge present.          Comments: Erythema         General:   alert   Heart: regular rate and rhythm, S1, S2 normal, no murmur, click, rub or gallop   Lungs: Not performed today.   Breast: Not performed today   Neck: Not performed today   Abdomen: Not performed today   CVA: Not performed today   Pelvis: External genitalia: erythema noted to bilateral labia majora, and minora  Vaginal: discharge, white and thick curdy iud strings per os   Extremities: Extremities normal, atraumatic, no cyanosis or edema   Neurologic: AOx3. Gait normal. Reflexes and motor strength normal and symmetric. Cranial nerves 2-12 and sensation grossly intact.   Psychiatric: Normal affect, judgement, and mood       Lab Review   Labs: No data reviewed    Imaging   No data reviewed    Assessment & Plan     ASSESSMENT  1. Vaginal itching    2. Vaginal irritation    3. Vaginal burning          PLAN  1.   Orders Placed This Encounter   Procedures    " NuSwab VG+ - Swab, Vagina       2. Medications prescribed this encounter:        New Medications Ordered This Visit   Medications    fluconazole (Diflucan) 150 MG tablet     Sig: Take 1 tablet by mouth Daily. Take additional dose in 3 days for a total of 3 doses     Dispense:  3 tablet     Refill:  1    nystatin (MYCOSTATIN) 297889 UNIT/GM cream     Sig: Apply 1 Application topically to the appropriate area as directed 2 (Two) Times a Day.     Dispense:  15 g     Refill:  0       3. Will treat for yeast based on exam  Avoid underwear  Apply cool compress to area  Start diflucan and can use topical for external relief  All questions answered and addressed  Can use benadryl at bedtime to help with sleep and possible help alleviate itching     Follow up: as needed    Jenni Mcdermott, APRN  6/5/2024

## 2024-07-16 ENCOUNTER — OFFICE VISIT (OUTPATIENT)
Dept: FAMILY MEDICINE CLINIC | Facility: CLINIC | Age: 24
End: 2024-07-16
Payer: COMMERCIAL

## 2024-07-16 VITALS
HEIGHT: 69 IN | OXYGEN SATURATION: 98 % | HEART RATE: 80 BPM | DIASTOLIC BLOOD PRESSURE: 78 MMHG | SYSTOLIC BLOOD PRESSURE: 100 MMHG | BODY MASS INDEX: 29.33 KG/M2 | TEMPERATURE: 97.8 F | RESPIRATION RATE: 16 BRPM | WEIGHT: 198 LBS

## 2024-07-16 DIAGNOSIS — L03.221 CELLULITIS OF NECK: Primary | ICD-10-CM

## 2024-07-16 PROCEDURE — 99213 OFFICE O/P EST LOW 20 MIN: CPT | Performed by: FAMILY MEDICINE

## 2024-07-16 RX ORDER — CEPHALEXIN 500 MG/1
500 CAPSULE ORAL 3 TIMES DAILY
Qty: 30 CAPSULE | Refills: 0 | Status: SHIPPED | OUTPATIENT
Start: 2024-07-16 | End: 2024-07-26

## 2024-07-16 NOTE — PROGRESS NOTES
Subjective     Sara Saul is a 24 y.o. female.     Chief Complaint   Patient presents with    Mass     On Sunday night, painful on back of neck also red can't turn head left all the way due to pain or sleep on that the side tender to touch        History of Present Illness     She is c/o swelling with redness and pain at the rt side of her neck for 3 days , sx worse over last night , pain worse with movement, hurts to touch, more redness.  Not sure about insect bites   She is UTD with Tdap      The following portions of the patient's history were reviewed and updated as appropriate: allergies, current medications, past family history, past medical history, past social history, past surgical history, and problem list.        Review of Systems   Musculoskeletal:  Positive for myalgias.   Skin:  Positive for color change.       Vitals:    07/16/24 1320   BP: 100/78   Pulse: 80   Resp: 16   Temp: 97.8 °F (36.6 °C)   SpO2: 98%           07/16/24  1320   Weight: 89.8 kg (198 lb)         Body mass index is 28.83 kg/m².      Current Outpatient Medications   Medication Sig Dispense Refill    escitalopram (Lexapro) 10 MG tablet Take 1 tablet by mouth Daily. 90 tablet 1    hydrocortisone 2.5 % ointment Apply 1 Application topically to the appropriate area as directed 2 (Two) Times a Day.      hydrOXYzine (ATARAX) 25 MG tablet Take 1 tablet by mouth 3 (Three) Times a Day As Needed for Anxiety. 30 tablet 2    vitamin D (ERGOCALCIFEROL) 1.25 MG (84224 UT) capsule capsule Take 1 capsule by mouth 1 (One) Time Per Week. 12 capsule 1    cephalexin (Keflex) 500 MG capsule Take 1 capsule by mouth 3 (Three) Times a Day for 10 days. 30 capsule 0     No current facility-administered medications for this visit.                Objective   Physical Exam  Vitals and nursing note reviewed.   Constitutional:       General: She is not in acute distress.     Appearance: She is not toxic-appearing.   Skin:     Findings: Erythema present.       Comments: ~ 3 x 4 area of swelling with redness and TTP on the rt side of the neck    Neurological:      Mental Status: She is alert and oriented to person, place, and time.   Psychiatric:         Mood and Affect: Mood normal.         Behavior: Behavior normal.         Thought Content: Thought content normal.           Assessment & Plan   Diagnoses and all orders for this visit:    1. Cellulitis of neck (Primary)  Comments:  Discussed supportive care  Orders:  -     cephalexin (Keflex) 500 MG capsule; Take 1 capsule by mouth 3 (Three) Times a Day for 10 days.  Dispense: 30 capsule; Refill: 0        I spent 23 minutes caring for this patient on this date of service. This time includes time spent by me in the following activities:preparing for the visit,reviewing previous medical records,  performing a medically appropriate examination and/or evaluation, counseling and educating the patient/family/caregiver, and documenting information in the medical record.     Patient was given instructions and counseling regarding her condition or for health maintenance advice. Please see specific information pulled into the AVS if appropriate.     I have fully discussed the nature of the medical condition(s) risks, complications, management, safe and proper use of medications.   Pt stated no allergy to the above prescribed medication.  I have discussed the SIDE EFFECT OF MEDICATION and importance TO report any side effect , the patient expressed good understanding.  Encouraged medication compliance and the importance of keeping scheduled follow up appointments with me and any other providers.    Patient instructed to follow up with our office for results on any labs/imaging ordered during this visit.    Home care discussed  All questions answered  Patient verbalizes understanding and agrees to treatment plan.     Follow up: Return in about 9 days (around 7/25/2024) for follow up on current illness.

## 2024-07-25 ENCOUNTER — OFFICE VISIT (OUTPATIENT)
Dept: FAMILY MEDICINE CLINIC | Facility: CLINIC | Age: 24
End: 2024-07-25
Payer: COMMERCIAL

## 2024-07-25 VITALS
OXYGEN SATURATION: 97 % | BODY MASS INDEX: 29.33 KG/M2 | SYSTOLIC BLOOD PRESSURE: 114 MMHG | HEIGHT: 69 IN | RESPIRATION RATE: 16 BRPM | TEMPERATURE: 97.8 F | WEIGHT: 198 LBS | DIASTOLIC BLOOD PRESSURE: 78 MMHG | HEART RATE: 93 BPM

## 2024-07-25 DIAGNOSIS — R68.89 FLU-LIKE SYMPTOMS: Primary | ICD-10-CM

## 2024-07-25 DIAGNOSIS — J11.1 INFLUENZA: ICD-10-CM

## 2024-07-25 DIAGNOSIS — J02.9 SORE THROAT: ICD-10-CM

## 2024-07-25 DIAGNOSIS — L03.221 CELLULITIS OF NECK: ICD-10-CM

## 2024-07-25 LAB
EXPIRATION DATE: ABNORMAL
EXPIRATION DATE: NORMAL
EXPIRATION DATE: NORMAL
FLUAV AG NPH QL: POSITIVE
FLUBV AG NPH QL: NEGATIVE
INTERNAL CONTROL: ABNORMAL
INTERNAL CONTROL: NORMAL
INTERNAL CONTROL: NORMAL
Lab: ABNORMAL
Lab: NORMAL
Lab: NORMAL
S PYO AG THROAT QL: NEGATIVE
SARS-COV-2 AG UPPER RESP QL IA.RAPID: NOT DETECTED

## 2024-07-25 PROCEDURE — 99214 OFFICE O/P EST MOD 30 MIN: CPT | Performed by: FAMILY MEDICINE

## 2024-07-25 PROCEDURE — 87880 STREP A ASSAY W/OPTIC: CPT | Performed by: FAMILY MEDICINE

## 2024-07-25 PROCEDURE — 87804 INFLUENZA ASSAY W/OPTIC: CPT | Performed by: FAMILY MEDICINE

## 2024-07-25 PROCEDURE — 87426 SARSCOV CORONAVIRUS AG IA: CPT | Performed by: FAMILY MEDICINE

## 2024-07-25 RX ORDER — OSELTAMIVIR PHOSPHATE 75 MG/1
75 CAPSULE ORAL 2 TIMES DAILY
Qty: 10 CAPSULE | Refills: 0 | Status: SHIPPED | OUTPATIENT
Start: 2024-07-25 | End: 2024-07-30

## 2024-07-25 NOTE — PROGRESS NOTES
Subjective     Sara Saul is a 24 y.o. female.     Chief Complaint   Patient presents with    Cellulitis of neck     F/u    Fatigue     Came back from a retreat     Sore Throat    Cough     Congestion no mucus       History of Present Illness     She is c/o  cough, congestion , ST,  body aches, feels fatigue and tired for 1 day    Has cellulitis of the neck, she finished full course of keflex , feels better, no more redness /pain/swelling       The following portions of the patient's history were reviewed and updated as appropriate: allergies, current medications, past family history, past medical history, past social history, past surgical history, and problem list.        Review of Systems   Constitutional:  Positive for fatigue.   HENT:  Positive for congestion.    Respiratory:  Positive for cough.        Vitals:    07/25/24 1611   BP: 114/78   Pulse: 93   Resp: 16   Temp: 97.8 °F (36.6 °C)   SpO2: 97%           07/25/24  1611   Weight: 89.8 kg (198 lb)         Body mass index is 28.83 kg/m².      Current Outpatient Medications   Medication Sig Dispense Refill    escitalopram (Lexapro) 10 MG tablet Take 1 tablet by mouth Daily. 90 tablet 1    hydrocortisone 2.5 % ointment Apply 1 Application topically to the appropriate area as directed 2 (Two) Times a Day.      hydrOXYzine (ATARAX) 25 MG tablet Take 1 tablet by mouth 3 (Three) Times a Day As Needed for Anxiety. 30 tablet 2    vitamin D (ERGOCALCIFEROL) 1.25 MG (60290 UT) capsule capsule Take 1 capsule by mouth 1 (One) Time Per Week. 12 capsule 1    oseltamivir (Tamiflu) 75 MG capsule Take 1 capsule by mouth 2 (Two) Times a Day for 5 days. 10 capsule 0     No current facility-administered medications for this visit.                Objective   Physical Exam  Vitals and nursing note reviewed.   Constitutional:       General: She is not in acute distress.     Appearance: She is not toxic-appearing.   HENT:      Nose: Nose normal. Congestion and rhinorrhea  present.   Neurological:      Mental Status: She is alert and oriented to person, place, and time.   Psychiatric:         Mood and Affect: Mood normal.         Behavior: Behavior normal.         Thought Content: Thought content normal.           Assessment & Plan   Diagnoses and all orders for this visit:    1. Flu-like symptoms (Primary)  Comments:  +ve flu  neg covid  Orders:  -     POCT SARBJIT SARS-CoV-2 Antigen RADHA  -     POCT Influenza A/B    2. Sore throat  Comments:  neg  Orders:  -     POCT rapid strep A    3. Cellulitis of neck  Comments:  sx resolved    4. Influenza  Comments:  Discussed supportive care , plenty of fluids  Orders:  -     oseltamivir (Tamiflu) 75 MG capsule; Take 1 capsule by mouth 2 (Two) Times a Day for 5 days.  Dispense: 10 capsule; Refill: 0          Patient was given instructions and counseling regarding her condition or for health maintenance advice. Please see specific information pulled into the AVS if appropriate.     I have fully discussed the nature of the medical condition(s) risks, complications, management, safe and proper use of medications.   Pt stated no allergy to the above prescribed medication.  I have discussed the SIDE EFFECT OF MEDICATION and importance TO report any side effect , the patient expressed good understanding.  Encouraged medication compliance and the importance of keeping scheduled follow up appointments with me and any other providers.    Patient instructed to follow up with our office for results on any labs/imaging ordered during this visit.    Home care discussed  All questions answered  Patient verbalizes understanding and agrees to treatment plan.     Follow up: Return for if no better or worsening symptoms.

## 2024-08-20 ENCOUNTER — OFFICE VISIT (OUTPATIENT)
Dept: FAMILY MEDICINE CLINIC | Facility: CLINIC | Age: 24
End: 2024-08-20
Payer: COMMERCIAL

## 2024-08-20 VITALS
DIASTOLIC BLOOD PRESSURE: 76 MMHG | TEMPERATURE: 97.7 F | WEIGHT: 198 LBS | BODY MASS INDEX: 29.33 KG/M2 | OXYGEN SATURATION: 99 % | HEIGHT: 69 IN | HEART RATE: 75 BPM | RESPIRATION RATE: 16 BRPM | SYSTOLIC BLOOD PRESSURE: 104 MMHG

## 2024-08-20 DIAGNOSIS — M79.632 PAIN AND SWELLING OF LEFT FOREARM: ICD-10-CM

## 2024-08-20 DIAGNOSIS — S59.912A INJURY OF LEFT FOREARM, INITIAL ENCOUNTER: Primary | ICD-10-CM

## 2024-08-20 DIAGNOSIS — R20.0 FINGER NUMBNESS: ICD-10-CM

## 2024-08-20 DIAGNOSIS — M79.89 PAIN AND SWELLING OF LEFT FOREARM: ICD-10-CM

## 2024-08-20 DIAGNOSIS — F41.9 ANXIETY: ICD-10-CM

## 2024-08-20 PROCEDURE — 99214 OFFICE O/P EST MOD 30 MIN: CPT | Performed by: FAMILY MEDICINE

## 2024-08-20 RX ORDER — MELOXICAM 15 MG/1
15 TABLET ORAL DAILY
Qty: 30 TABLET | Refills: 0 | Status: SHIPPED | OUTPATIENT
Start: 2024-08-20

## 2024-08-20 RX ORDER — PREDNISONE 20 MG/1
40 TABLET ORAL DAILY
Qty: 10 TABLET | Refills: 0 | Status: SHIPPED | OUTPATIENT
Start: 2024-08-20 | End: 2024-08-25

## 2024-08-20 NOTE — PROGRESS NOTES
Subjective     Sara Saul is a 24 y.o. female.     Chief Complaint   Patient presents with    Follow-up     X ray on left arm still have pain.     lt arm injury       History of Present Illness     She is c/o left forearm pain for 1 week while she was [place] football on the beach and fell onto her left arm. Has   Pain from elbow to the wrist 6-8/10, not able to twisted or move it , pain is shooting .   Has Numbness at the tip of her Lt fingers   Not able to do her daily activity due to pain and limitation in ROM  Went to , XR was neg, they put her arm in a sling.  She still in pain with worsening sx     Anxiety;doing well , no longer taking Lexapro. Still taking Atarax PRN.   Following with therapist biweekly   Was on lexapro 10 mg.      XR Forearm 2 View Left (08/13/2024 16:52)        The following portions of the patient's history were reviewed and updated as appropriate: allergies, current medications, past family history, past medical history, past social history, past surgical history, and problem list.        Review of Systems   Musculoskeletal:  Positive for arthralgias.   Neurological:  Positive for numbness.       Vitals:    08/20/24 1415   BP: 104/76   Pulse: 75   Resp: 16   Temp: 97.7 °F (36.5 °C)   SpO2: 99%           08/20/24  1415   Weight: 89.8 kg (198 lb)         Body mass index is 28.83 kg/m².      Current Outpatient Medications   Medication Sig Dispense Refill    meloxicam (Mobic) 15 MG tablet Take 1 tablet by mouth Daily. 30 tablet 0    predniSONE (DELTASONE) 20 MG tablet Take 2 tablets by mouth Daily for 5 days. 10 tablet 0     No current facility-administered medications for this visit.                Objective   Physical Exam  Vitals and nursing note reviewed.   Constitutional:       General: She is not in acute distress.     Appearance: She is not toxic-appearing.   Cardiovascular:      Pulses: Normal pulses.   Musculoskeletal:         General: Tenderness present. No deformity.       Comments: Lt arm in a sling   Very limited ROM of the Lt arm due to pain    Skin:     Findings: No bruising or erythema.   Neurological:      Mental Status: She is alert and oriented to person, place, and time.      Sensory: No sensory deficit.   Psychiatric:         Mood and Affect: Mood normal.         Behavior: Behavior normal.         Thought Content: Thought content normal.           Assessment & Plan   Diagnoses and all orders for this visit:    1. Injury of left forearm, initial encounter (Primary)  -     MRI radius ulna left wo contrast; Future  -     meloxicam (Mobic) 15 MG tablet; Take 1 tablet by mouth Daily.  Dispense: 30 tablet; Refill: 0  -     predniSONE (DELTASONE) 20 MG tablet; Take 2 tablets by mouth Daily for 5 days.  Dispense: 10 tablet; Refill: 0    2. Finger numbness  -     MRI radius ulna left wo contrast; Future  -     meloxicam (Mobic) 15 MG tablet; Take 1 tablet by mouth Daily.  Dispense: 30 tablet; Refill: 0  -     predniSONE (DELTASONE) 20 MG tablet; Take 2 tablets by mouth Daily for 5 days.  Dispense: 10 tablet; Refill: 0    3. Pain and swelling of left forearm  -     MRI radius ulna left wo contrast; Future  -     meloxicam (Mobic) 15 MG tablet; Take 1 tablet by mouth Daily.  Dispense: 30 tablet; Refill: 0  -     predniSONE (DELTASONE) 20 MG tablet; Take 2 tablets by mouth Daily for 5 days.  Dispense: 10 tablet; Refill: 0    4. Anxiety  Comments:  sx improved, doing well on Atarax PRN        I spent 34 minutes caring for this patient on this date of service. This time includes time spent by me in the following activities:preparing for the visit,reviewing previous medical records,  performing a medically appropriate examination and/or evaluation, counseling and educating the patient/family/caregiver, and documenting information in the medical record.       Patient was given instructions and counseling regarding her condition or for health maintenance advice. Please see specific  information pulled into the AVS if appropriate.       I have fully discussed the nature of the medical condition(s) risks, complications, management, safe and proper use of medications.   Pt stated no allergy to the above prescribed medication.  I have discussed the SIDE EFFECT OF MEDICATION and importance TO report any side effect , the patient expressed good understanding.  Encouraged medication compliance and the importance of keeping scheduled follow up appointments with me and any other providers.    Patient instructed to follow up with our office for results on any labs/imaging ordered during this visit.    Home care discussed  All questions answered  Patient verbalizes understanding and agrees to treatment plan.     Follow up: Return in about 4 weeks (around 9/17/2024) for follow up on current illness.

## 2024-08-21 ENCOUNTER — TELEPHONE (OUTPATIENT)
Dept: FAMILY MEDICINE CLINIC | Facility: CLINIC | Age: 24
End: 2024-08-21
Payer: COMMERCIAL

## 2024-08-21 NOTE — TELEPHONE ENCOUNTER
Pt called to get her MRI switched to a different location. The scheduling department is called [email or web address]. The fax number is [phone] and the phone number is [phone]. I asked patient if I faxed the referral over to the number she provided they would contact her to schedule and she did say yes but she is requesting we call anyway to ensure that this gets completed ASAP since she is still in pain         
None

## 2024-09-03 DIAGNOSIS — M79.89 PAIN AND SWELLING OF LEFT FOREARM: ICD-10-CM

## 2024-09-03 DIAGNOSIS — S52.135D CLOSED NONDISPLACED FRACTURE OF NECK OF LEFT RADIUS WITH ROUTINE HEALING, SUBSEQUENT ENCOUNTER: ICD-10-CM

## 2024-09-03 DIAGNOSIS — M79.632 PAIN AND SWELLING OF LEFT FOREARM: ICD-10-CM

## 2024-09-03 DIAGNOSIS — S59.912A INJURY OF LEFT FOREARM, INITIAL ENCOUNTER: Primary | ICD-10-CM

## 2024-09-03 DIAGNOSIS — S59.912A INJURY OF LEFT FOREARM, INITIAL ENCOUNTER: ICD-10-CM

## 2024-09-03 DIAGNOSIS — R20.0 FINGER NUMBNESS: ICD-10-CM

## 2024-09-17 ENCOUNTER — OFFICE VISIT (OUTPATIENT)
Dept: FAMILY MEDICINE CLINIC | Facility: CLINIC | Age: 24
End: 2024-09-17
Payer: COMMERCIAL

## 2024-09-17 VITALS
HEART RATE: 75 BPM | TEMPERATURE: 97.7 F | RESPIRATION RATE: 16 BRPM | SYSTOLIC BLOOD PRESSURE: 106 MMHG | HEIGHT: 69 IN | WEIGHT: 200.2 LBS | OXYGEN SATURATION: 98 % | BODY MASS INDEX: 29.65 KG/M2 | DIASTOLIC BLOOD PRESSURE: 70 MMHG

## 2024-09-17 DIAGNOSIS — M79.89 PAIN AND SWELLING OF LEFT FOREARM: ICD-10-CM

## 2024-09-17 DIAGNOSIS — R20.0 FINGER NUMBNESS: ICD-10-CM

## 2024-09-17 DIAGNOSIS — M79.632 PAIN AND SWELLING OF LEFT FOREARM: ICD-10-CM

## 2024-09-17 DIAGNOSIS — S59.912D INJURY OF LEFT FOREARM, SUBSEQUENT ENCOUNTER: Primary | ICD-10-CM

## 2024-09-17 DIAGNOSIS — S52.135D CLOSED NONDISPLACED FRACTURE OF NECK OF LEFT RADIUS WITH ROUTINE HEALING, SUBSEQUENT ENCOUNTER: ICD-10-CM

## 2024-09-17 PROCEDURE — 99213 OFFICE O/P EST LOW 20 MIN: CPT | Performed by: FAMILY MEDICINE

## 2024-10-18 ENCOUNTER — OFFICE VISIT (OUTPATIENT)
Dept: OBSTETRICS AND GYNECOLOGY | Age: 24
End: 2024-10-18
Payer: COMMERCIAL

## 2024-10-18 VITALS
BODY MASS INDEX: 29.06 KG/M2 | WEIGHT: 203 LBS | DIASTOLIC BLOOD PRESSURE: 68 MMHG | SYSTOLIC BLOOD PRESSURE: 110 MMHG | HEIGHT: 70 IN

## 2024-10-18 DIAGNOSIS — R35.1 URINATION, EXCESSIVE AT NIGHT: ICD-10-CM

## 2024-10-18 DIAGNOSIS — Z97.5 IUD (INTRAUTERINE DEVICE) IN PLACE: ICD-10-CM

## 2024-10-18 DIAGNOSIS — Z12.4 SCREENING FOR MALIGNANT NEOPLASM OF CERVIX: ICD-10-CM

## 2024-10-18 DIAGNOSIS — Z01.419 WELL FEMALE EXAM WITH ROUTINE GYNECOLOGICAL EXAM: Primary | ICD-10-CM

## 2024-10-18 DIAGNOSIS — Z11.51 SCREENING FOR HUMAN PAPILLOMAVIRUS (HPV): ICD-10-CM

## 2024-10-18 DIAGNOSIS — Z11.3 SCREENING EXAMINATION FOR STI: ICD-10-CM

## 2024-10-18 RX ORDER — LEVONORGESTREL 19.5 MG/1
1 INTRAUTERINE DEVICE INTRAUTERINE ONCE
COMMUNITY

## 2024-10-18 NOTE — PROGRESS NOTES
Subjective     Chief Complaint   Patient presents with    Annual Exam     Annual exam last pap 10/06/2023 neg/no hpv done.        History of Present Illness    Sara Saul is a 24 y.o.  who presents for annual exam.  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 Patient has had an implant.         Current contraception: IUD  History of abnormal Pap smear: no  Received Gardasil immunization: yes  Perform regular self breast exam: no  Family history of uterine or ovarian cancer: no  Family History of colon cancer: no  Family history of breast cancer: no    Mammogram: not indicated.  Colonoscopy: not indicated.  DEXA: not indicated.    Exercise: moderately active  Calcium/Vitamin D: adequate intake and uses supplements    The following portions of the patient's history were reviewed and updated as appropriate: allergies, current medications, past family history, past medical history, past social history, past surgical history, and problem list.    Review of Systems   Constitutional: Negative.    HENT: Negative.     Eyes: Negative.    Respiratory: Negative.     Cardiovascular: Negative.    Gastrointestinal: Negative.    Endocrine: Negative.    Genitourinary: Negative.    Musculoskeletal: Negative.    Skin: Negative.    Allergic/Immunologic: Negative.    Neurological: Negative.    Hematological: Negative.    Psychiatric/Behavioral: Negative.             Objective   Physical Exam  Vitals and nursing note reviewed.   Constitutional:       Appearance: Normal appearance.   HENT:      Head: Normocephalic.   Eyes:      Pupils:  "Pupils are equal, round, and reactive to light.   Cardiovascular:      Rate and Rhythm: Normal rate and regular rhythm.      Pulses: Normal pulses.      Heart sounds: Normal heart sounds.   Pulmonary:      Effort: Pulmonary effort is normal.      Breath sounds: Normal breath sounds.   Chest:   Breasts:     Right: Normal.      Left: Normal.   Abdominal:      General: Abdomen is flat. Bowel sounds are normal.      Palpations: Abdomen is soft.   Genitourinary:     General: Normal vulva.      Exam position: Lithotomy position.      Vagina: Normal.      Cervix: Normal.      Uterus: Normal.       Adnexa: Right adnexa normal and left adnexa normal.      Rectum: Normal.      Comments: Iud strings per os  Musculoskeletal:         General: Normal range of motion.      Cervical back: Full passive range of motion without pain and normal range of motion.      Right lower leg: No edema.      Left lower leg: No edema.   Lymphadenopathy:      Head:      Right side of head: No submental or submandibular adenopathy.      Left side of head: No submental or submandibular adenopathy.   Skin:     General: Skin is warm and dry.   Neurological:      General: No focal deficit present.      Mental Status: She is alert.      Gait: Gait is intact.   Psychiatric:         Attention and Perception: Attention normal.         Mood and Affect: Mood normal.         Speech: Speech normal.         /68   Ht 176.5 cm (69.5\")   Wt 92.1 kg (203 lb)   BMI 29.55 kg/m²     Assessment & Plan   Diagnoses and all orders for this visit:    1. Well female exam with routine gynecological exam (Primary)    2. Screening for human papillomavirus (HPV)  -     IGP, Rfx Aptima HPV ASCU    3. Screening for malignant neoplasm of cervix  -     IGP, Rfx Aptima HPV ASCU    4. Screening examination for STI  -     NuSwab VG+ - Swab, Vagina    5. IUD (intrauterine device) in place    6. Urination, excessive at night      Pap today guidelines reviewed  Will sent testing " and call with results  Discussed cutting back fluids before bed  Decrease caffeine  Try magnesium at bedtime for sleep and stress   All questions answered and addressed  Breast self exam technique reviewed and patient encouraged to perform self-exam monthly.  Discussed healthy lifestyle modifications.  Recommended 30 minutes of aerobic exercise five times per week.  Encouraged multivitamin use with vit d  Call the office if you have not received results from today's visit within 2 weeks               Jenni Mcdermott, NICOLE  10/18/2024, 15:26 EDT

## 2024-10-21 LAB
A VAGINAE DNA VAG QL NAA+PROBE: NORMAL SCORE
BVAB2 DNA VAG QL NAA+PROBE: NORMAL SCORE
C ALBICANS DNA VAG QL NAA+PROBE: NEGATIVE
C GLABRATA DNA VAG QL NAA+PROBE: NEGATIVE
C TRACH DNA SPEC QL NAA+PROBE: NEGATIVE
MEGA1 DNA VAG QL NAA+PROBE: NORMAL SCORE
N GONORRHOEA DNA VAG QL NAA+PROBE: NEGATIVE
T VAGINALIS DNA VAG QL NAA+PROBE: NEGATIVE

## 2024-10-24 LAB
CONV .: NORMAL
CYTOLOGIST CVX/VAG CYTO: NORMAL
CYTOLOGY CVX/VAG DOC CYTO: NORMAL
CYTOLOGY CVX/VAG DOC THIN PREP: NORMAL
DX ICD CODE: NORMAL
Lab: NORMAL
OTHER STN SPEC: NORMAL
STAT OF ADQ CVX/VAG CYTO-IMP: NORMAL